# Patient Record
Sex: FEMALE | Race: BLACK OR AFRICAN AMERICAN | NOT HISPANIC OR LATINO | Employment: FULL TIME | URBAN - METROPOLITAN AREA
[De-identification: names, ages, dates, MRNs, and addresses within clinical notes are randomized per-mention and may not be internally consistent; named-entity substitution may affect disease eponyms.]

---

## 2017-02-06 RX ORDER — FLUCONAZOLE 150 MG/1
150 TABLET ORAL ONCE
Qty: 1 TABLET | Refills: 0 | Status: SHIPPED | OUTPATIENT
Start: 2017-02-06 | End: 2017-02-06

## 2017-03-01 ENCOUNTER — OFFICE VISIT (OUTPATIENT)
Dept: FAMILY MEDICINE CLINIC | Facility: CLINIC | Age: 62
End: 2017-03-01

## 2017-03-01 VITALS
DIASTOLIC BLOOD PRESSURE: 64 MMHG | WEIGHT: 149 LBS | HEIGHT: 64 IN | TEMPERATURE: 97.8 F | RESPIRATION RATE: 16 BRPM | BODY MASS INDEX: 25.44 KG/M2 | HEART RATE: 76 BPM | OXYGEN SATURATION: 94 % | SYSTOLIC BLOOD PRESSURE: 126 MMHG

## 2017-03-01 DIAGNOSIS — M79.7 FIBROMYALGIA: ICD-10-CM

## 2017-03-01 DIAGNOSIS — E55.9 VITAMIN D DEFICIENCY: ICD-10-CM

## 2017-03-01 DIAGNOSIS — Z12.31 ENCOUNTER FOR SCREENING MAMMOGRAM FOR BREAST CANCER: ICD-10-CM

## 2017-03-01 DIAGNOSIS — I10 ESSENTIAL HYPERTENSION: Primary | ICD-10-CM

## 2017-03-01 PROCEDURE — 99213 OFFICE O/P EST LOW 20 MIN: CPT | Performed by: PHYSICIAN ASSISTANT

## 2017-03-01 RX ORDER — TELMISARTAN AND AMLODIPINE 5; 80 MG/1; MG/1
1 TABLET ORAL DAILY
Qty: 90 TABLET | Refills: 1 | Status: SHIPPED | OUTPATIENT
Start: 2017-03-01 | End: 2017-08-14 | Stop reason: SDUPTHER

## 2017-03-01 NOTE — PROGRESS NOTES
Subjective   Boby Robles is a 61 y.o. female.     History of Present Illness   Boby Robles 61 y.o. female who presents today for routine follow up check and medication refills.  she has a history of   Patient Active Problem List   Diagnosis   • Allergy   • Fibromyalgia   • Hypertension   • Vitamin D deficiency   .  Since the last visit, she has overall felt well.  She has Hypertenision and is well controlled on medication.  she has been compliant with current medications have reviewed them.  The patient denies medication side effects.    Skin area itching right side neck on and off; no rash; can make her lymph nodes swell on that side    Had knee surgery  I have her avoiding NSAIDs d/t made her creat 1.08  Fibromyalgia is managed with exercise  No results found for: LIPASE  No results found for: CHOL  Lab Results   Component Value Date    TRIG 75 07/29/2016     Lab Results   Component Value Date    HDL 90 (H) 07/29/2016     No results found for: LDLCALC  Lab Results   Component Value Date    LDL 93 07/29/2016     No results found for: HDLLDLRATIO  No components found for: CHOLHDL   Mammo due next month    She wants to stay on Premarin and aware of possible risk VTE and MI/CVA, breast cancer    The following portions of the patient's history were reviewed and updated as appropriate: allergies, current medications, past family history, past medical history, past social history, past surgical history and problem list.    Review of Systems   Constitutional: Negative for activity change, appetite change and unexpected weight change.   HENT: Negative for nosebleeds and trouble swallowing.    Eyes: Negative for pain and visual disturbance.   Respiratory: Negative for chest tightness, shortness of breath and wheezing.    Cardiovascular: Negative for chest pain and palpitations.   Gastrointestinal: Negative for abdominal pain and blood in stool.   Endocrine: Negative.    Genitourinary: Negative for difficulty  urinating and hematuria.   Musculoskeletal: Negative for joint swelling.   Skin: Negative for color change and rash.   Allergic/Immunologic: Negative.    Neurological: Negative for syncope and speech difficulty.   Hematological: Negative for adenopathy.   Psychiatric/Behavioral: Negative for agitation and confusion.   All other systems reviewed and are negative.      Objective   Physical Exam   Constitutional: She is oriented to person, place, and time. She appears well-developed and well-nourished. No distress.   HENT:   Head: Normocephalic and atraumatic.   Eyes: Conjunctivae and EOM are normal. Pupils are equal, round, and reactive to light. Right eye exhibits no discharge. Left eye exhibits no discharge. No scleral icterus.   Neck: Normal range of motion. Neck supple. No tracheal deviation present. No thyromegaly present.   Cardiovascular: Normal rate, regular rhythm, normal heart sounds, intact distal pulses and normal pulses.  Exam reveals no gallop.    No murmur heard.  Pulmonary/Chest: Effort normal and breath sounds normal. No respiratory distress. She has no wheezes. She has no rales.   Musculoskeletal: Normal range of motion.   Lymphadenopathy:     She has no cervical adenopathy.   Neurological: She is alert and oriented to person, place, and time. She exhibits normal muscle tone. Coordination normal.   Skin: Skin is warm. No rash noted. No erythema. No pallor.   Psychiatric: She has a normal mood and affect. Her behavior is normal. Judgment and thought content normal.   Nursing note and vitals reviewed.      Assessment/Plan   Problems Addressed this Visit        Cardiovascular and Mediastinum    Hypertension - Primary    Relevant Medications    telmisartan-amLODIPine (TWYNSTA) 80-5 MG per tablet    Other Relevant Orders    Comprehensive metabolic panel    Lipid panel    CBC and Differential    TSH    T4, Free    Vitamin D 25 Hydroxy    Mammo Screening Bilateral With CAD       Digestive    Vitamin D  deficiency    Relevant Orders    Comprehensive metabolic panel    Lipid panel    CBC and Differential    TSH    T4, Free    Vitamin D 25 Hydroxy    Mammo Screening Bilateral With CAD       Musculoskeletal and Integument    Fibromyalgia    Relevant Orders    Comprehensive metabolic panel    Lipid panel    CBC and Differential    TSH    T4, Free    Vitamin D 25 Hydroxy    Mammo Screening Bilateral With CAD      Other Visit Diagnoses     Encounter for screening mammogram for breast cancer        Relevant Orders    Comprehensive metabolic panel    Lipid panel    CBC and Differential    TSH    T4, Free    Vitamin D 25 Hydroxy    Mammo Screening Bilateral With CAD

## 2017-03-01 NOTE — PATIENT INSTRUCTIONS
Labs July 1 or after  Low glycemic index diet  Exercise 30 minutes most days of the week  Make sure you get results on any labs or tests we ordered today  We discussed medications and how to take them as prescribed  Sleep 6-8 hours each night if possible  If you have not signed up for [employer], please activate your code ASAP from your After Visit Summary today    LDL goal <100  LDL goal if heart disease <70  HDL goal >60  Triglyceride goal <150  BP goal =<130/80  Fasting glucose <100    Try Hydrocortisone cream to chin 1%

## 2017-05-31 ENCOUNTER — OFFICE VISIT (OUTPATIENT)
Dept: FAMILY MEDICINE CLINIC | Facility: CLINIC | Age: 62
End: 2017-05-31

## 2017-05-31 VITALS
OXYGEN SATURATION: 97 % | HEIGHT: 64 IN | DIASTOLIC BLOOD PRESSURE: 60 MMHG | HEART RATE: 81 BPM | BODY MASS INDEX: 25.44 KG/M2 | TEMPERATURE: 97 F | RESPIRATION RATE: 16 BRPM | SYSTOLIC BLOOD PRESSURE: 120 MMHG | WEIGHT: 149 LBS

## 2017-05-31 DIAGNOSIS — H61.22 CERUMEN DEBRIS ON TYMPANIC MEMBRANE, LEFT: Primary | ICD-10-CM

## 2017-05-31 PROCEDURE — 99212 OFFICE O/P EST SF 10 MIN: CPT | Performed by: PHYSICIAN ASSISTANT

## 2017-07-01 ENCOUNTER — RESULTS ENCOUNTER (OUTPATIENT)
Dept: FAMILY MEDICINE CLINIC | Facility: CLINIC | Age: 62
End: 2017-07-01

## 2017-07-01 DIAGNOSIS — E55.9 VITAMIN D DEFICIENCY: ICD-10-CM

## 2017-07-01 DIAGNOSIS — Z12.31 ENCOUNTER FOR SCREENING MAMMOGRAM FOR BREAST CANCER: ICD-10-CM

## 2017-07-01 DIAGNOSIS — M79.7 FIBROMYALGIA: ICD-10-CM

## 2017-07-01 DIAGNOSIS — I10 ESSENTIAL HYPERTENSION: ICD-10-CM

## 2017-07-10 LAB
25(OH)D3+25(OH)D2 SERPL-MCNC: 31.2 NG/ML (ref 30–100)
ALBUMIN SERPL-MCNC: 3.9 G/DL (ref 3.6–4.8)
ALBUMIN/GLOB SERPL: 1.2 {RATIO} (ref 1.2–2.2)
ALP SERPL-CCNC: 63 IU/L (ref 39–117)
ALT SERPL-CCNC: 16 IU/L (ref 0–32)
AST SERPL-CCNC: 18 IU/L (ref 0–40)
BASOPHILS # BLD AUTO: 0.1 X10E3/UL (ref 0–0.2)
BASOPHILS NFR BLD AUTO: 1 %
BILIRUB SERPL-MCNC: 0.4 MG/DL (ref 0–1.2)
BUN SERPL-MCNC: 13 MG/DL (ref 8–27)
BUN/CREAT SERPL: 13 (ref 12–28)
CALCIUM SERPL-MCNC: 9.6 MG/DL (ref 8.7–10.3)
CHLORIDE SERPL-SCNC: 105 MMOL/L (ref 96–106)
CHOLEST SERPL-MCNC: 186 MG/DL (ref 100–199)
CO2 SERPL-SCNC: 25 MMOL/L (ref 18–29)
CREAT SERPL-MCNC: 0.98 MG/DL (ref 0.57–1)
EOSINOPHIL # BLD AUTO: 0.2 X10E3/UL (ref 0–0.4)
EOSINOPHIL NFR BLD AUTO: 3 %
ERYTHROCYTE [DISTWIDTH] IN BLOOD BY AUTOMATED COUNT: 12.4 % (ref 12.3–15.4)
GLOBULIN SER CALC-MCNC: 3.2 G/DL (ref 1.5–4.5)
GLUCOSE SERPL-MCNC: 86 MG/DL (ref 65–99)
HCT VFR BLD AUTO: 39 % (ref 34–46.6)
HDLC SERPL-MCNC: 90 MG/DL
HGB BLD-MCNC: 12.1 G/DL (ref 11.1–15.9)
IMM GRANULOCYTES # BLD: 0 X10E3/UL (ref 0–0.1)
IMM GRANULOCYTES NFR BLD: 0 %
LDLC SERPL CALC-MCNC: 81 MG/DL (ref 0–99)
LYMPHOCYTES # BLD AUTO: 1.9 X10E3/UL (ref 0.7–3.1)
LYMPHOCYTES NFR BLD AUTO: 34 %
MCH RBC QN AUTO: 29.3 PG (ref 26.6–33)
MCHC RBC AUTO-ENTMCNC: 31 G/DL (ref 31.5–35.7)
MCV RBC AUTO: 94 FL (ref 79–97)
MONOCYTES # BLD AUTO: 0.3 X10E3/UL (ref 0.1–0.9)
MONOCYTES NFR BLD AUTO: 6 %
NEUTROPHILS # BLD AUTO: 3.2 X10E3/UL (ref 1.4–7)
NEUTROPHILS NFR BLD AUTO: 56 %
PLATELET # BLD AUTO: 261 X10E3/UL (ref 150–379)
POTASSIUM SERPL-SCNC: 4.4 MMOL/L (ref 3.5–5.2)
PROT SERPL-MCNC: 7.1 G/DL (ref 6–8.5)
RBC # BLD AUTO: 4.13 X10E6/UL (ref 3.77–5.28)
SODIUM SERPL-SCNC: 145 MMOL/L (ref 134–144)
T4 FREE SERPL-MCNC: 1.34 NG/DL (ref 0.82–1.77)
TRIGL SERPL-MCNC: 77 MG/DL (ref 0–149)
TSH SERPL DL<=0.005 MIU/L-ACNC: 0.94 UIU/ML (ref 0.45–4.5)
VLDLC SERPL CALC-MCNC: 15 MG/DL (ref 5–40)
WBC # BLD AUTO: 5.7 X10E3/UL (ref 3.4–10.8)

## 2017-08-15 RX ORDER — TELMISARTAN AND AMLODIPINE 5; 80 MG/1; MG/1
TABLET ORAL
Qty: 90 TABLET | Refills: 0 | Status: SHIPPED | OUTPATIENT
Start: 2017-08-15 | End: 2017-11-03 | Stop reason: SDUPTHER

## 2017-11-03 ENCOUNTER — OFFICE VISIT (OUTPATIENT)
Dept: FAMILY MEDICINE CLINIC | Facility: CLINIC | Age: 62
End: 2017-11-03

## 2017-11-03 VITALS
TEMPERATURE: 98.3 F | BODY MASS INDEX: 25.61 KG/M2 | WEIGHT: 150 LBS | HEIGHT: 64 IN | RESPIRATION RATE: 16 BRPM | HEART RATE: 66 BPM | OXYGEN SATURATION: 100 % | DIASTOLIC BLOOD PRESSURE: 70 MMHG | SYSTOLIC BLOOD PRESSURE: 120 MMHG

## 2017-11-03 DIAGNOSIS — M54.50 ACUTE RIGHT-SIDED LOW BACK PAIN WITHOUT SCIATICA: Primary | ICD-10-CM

## 2017-11-03 DIAGNOSIS — Z79.1 NSAID LONG-TERM USE: ICD-10-CM

## 2017-11-03 DIAGNOSIS — R35.0 URINARY FREQUENCY: ICD-10-CM

## 2017-11-03 DIAGNOSIS — M25.551 RIGHT HIP PAIN: ICD-10-CM

## 2017-11-03 LAB
BILIRUB BLD-MCNC: NEGATIVE MG/DL
CLARITY, POC: CLEAR
COLOR UR: YELLOW
GLUCOSE UR STRIP-MCNC: NEGATIVE MG/DL
KETONES UR QL: NEGATIVE
LEUKOCYTE EST, POC: NEGATIVE
NITRITE UR-MCNC: NEGATIVE MG/ML
PH UR: 7 [PH] (ref 5–8)
PROT UR STRIP-MCNC: NEGATIVE MG/DL
RBC # UR STRIP: NEGATIVE /UL
SP GR UR: 1.01 (ref 1–1.03)
UROBILINOGEN UR QL: NORMAL

## 2017-11-03 PROCEDURE — 99214 OFFICE O/P EST MOD 30 MIN: CPT | Performed by: PHYSICIAN ASSISTANT

## 2017-11-03 PROCEDURE — 81003 URINALYSIS AUTO W/O SCOPE: CPT | Performed by: PHYSICIAN ASSISTANT

## 2017-11-03 PROCEDURE — 73502 X-RAY EXAM HIP UNI 2-3 VIEWS: CPT | Performed by: PHYSICIAN ASSISTANT

## 2017-11-03 RX ORDER — TELMISARTAN AND AMLODIPINE 5; 80 MG/1; MG/1
1 TABLET ORAL DAILY
Qty: 90 TABLET | Refills: 1 | Status: SHIPPED | OUTPATIENT
Start: 2017-11-03 | End: 2018-05-13 | Stop reason: SDUPTHER

## 2017-11-03 RX ORDER — MELOXICAM 15 MG/1
15 TABLET ORAL DAILY
COMMUNITY
End: 2018-11-05 | Stop reason: SDUPTHER

## 2017-11-03 NOTE — PROGRESS NOTES
Subjective   Boby Robles is a 62 y.o. female.     History of Present Illness   Having hip and back pain;   Boby Robles 62 y.o. female presents for evaluation and treatment of  low back pain right lumbar and right groin. The patient first noted symptoms several months ago. It was related to stumbled about a month ago and pain since then.  I will need to get hip xray;  will copy for her to take to Dr Lerma appt 11-27-17.  The pain intensity is mild and moderate , and is located right side , lumbar and right hip. The pain is described as aching, throbbing and stiff and occurs with movement, after prolonged rest then moves, with prolonged sitting, standing, laying, walking and constantly. The symptoms are progressive. Symptoms are exacerbated by light exertion and prolonged sitting, standing, and laying. Factors which relieve the pain include NSAID's and heat. Other associated symptoms include denies motor loss, denies sensory loss, denies burning in legs, denies N/T in legs, denies weakness in legs, denies loss of muscle tone and denies loss of bowel or bladder control. Previous history of symptoms: last few months. Treatment efforts have included nothing helps , without relief.      Boby Robles 62 y.o. female who presents today for routine follow up check and medication refills.  she has a history of   Patient Active Problem List   Diagnosis   • Allergy   • Fibromyalgia   • Hypertension   • Vitamin D deficiency   .  Since the last visit, she has overall felt well.  She has Hypertenision and is well controlled on medication.  she has been compliant with current medications have reviewed them.  The patient denies medication side effects.  mobic does help knee pain  X-Ray  Interpretation report in house X-rays that I personally viewed    Relevant Clinical Issues/Diagnoses/Indications:  Pain right hip        Clinical Findings:  DDD changes and no fx          Comparative Data:  none          Date of Previous  X-ray:  Change on current X-ray    Stop NSAID if GI upset or any signs tarry or blood in stools  Long term use of NSAIDs can lead to heart disease and strokes, as well as GI bleeding/ulcers, and cause kidney disease.   Lab Results   Component Value Date    BUN 13 07/08/2017    CREATININE 0.98 07/08/2017    EGFRIFNONA 62 07/08/2017    EGFRIFAFRI 72 07/08/2017    BCR 13 07/08/2017    K 4.4 07/08/2017    CO2 25 07/08/2017    CALCIUM 9.6 07/08/2017    PROTENTOTREF 7.1 07/08/2017    ALBUMIN 3.9 07/08/2017    LABIL2 1.2 07/08/2017    AST 18 07/08/2017    ALT 16 07/08/2017     Lab Results   Component Value Date    CHLPL 186 07/08/2017    TRIG 77 07/08/2017    HDL 90 07/08/2017    LDL 81 07/08/2017   vit D was 31  She is on HRT and aware of VTE, breast cancer risk    She is having urinary frequency in last week    The following portions of the patient's history were reviewed and updated as appropriate: allergies, current medications, past family history, past medical history, past social history, past surgical history and problem list.    Review of Systems   Constitutional: Negative for activity change, appetite change and unexpected weight change.   HENT: Negative for nosebleeds and trouble swallowing.    Eyes: Negative for pain and visual disturbance.   Respiratory: Negative for chest tightness, shortness of breath and wheezing.    Cardiovascular: Negative for chest pain and palpitations.   Gastrointestinal: Negative for abdominal pain and blood in stool.   Endocrine: Negative.    Genitourinary: Positive for frequency. Negative for difficulty urinating and hematuria.   Musculoskeletal: Positive for arthralgias, back pain and myalgias. Negative for joint swelling.   Skin: Negative for color change and rash.   Allergic/Immunologic: Negative.    Neurological: Negative for syncope and speech difficulty.   Hematological: Negative for adenopathy.   Psychiatric/Behavioral: Negative for agitation and confusion.   All other  systems reviewed and are negative.      Objective   Physical Exam   Constitutional: She is oriented to person, place, and time. She appears well-developed and well-nourished. No distress.   HENT:   Head: Normocephalic and atraumatic.   Eyes: Conjunctivae and EOM are normal. Pupils are equal, round, and reactive to light. Right eye exhibits no discharge. Left eye exhibits no discharge. No scleral icterus.   Neck: Normal range of motion. Neck supple. No tracheal deviation present. No thyromegaly present.   Cardiovascular: Normal rate, regular rhythm, normal heart sounds, intact distal pulses and normal pulses.  Exam reveals no gallop.    No murmur heard.  Pulmonary/Chest: Effort normal and breath sounds normal. No respiratory distress. She has no wheezes. She has no rales.   Musculoskeletal: Normal range of motion. She exhibits tenderness (right greater trochanter; sore in groin and no lymph nodes palp;  sore right lumbar piriformis area). She exhibits no edema or deformity.   Hip ROM pain right and hurts in back and groin;    Neurological: She is alert and oriented to person, place, and time. She exhibits normal muscle tone. Coordination normal.   Skin: Skin is warm. No rash noted. No erythema. No pallor.   Psychiatric: She has a normal mood and affect. Her behavior is normal. Judgment and thought content normal.   Nursing note and vitals reviewed.      Assessment/Plan   Problems Addressed this Visit     None      Visit Diagnoses     Acute right-sided low back pain without sciatica    -  Primary    Right hip pain        Relevant Orders    XR hip w or wo pelvis 2-3 view right    Urinary frequency        Relevant Orders    POC Urinalysis Dipstick, Automated    NSAID long-term use        Relevant Orders    Basic Metabolic Panel

## 2017-11-03 NOTE — PATIENT INSTRUCTIONS
Stop NSAID if GI upset or any signs tarry or blood in stools  Low glycemic index diet  Exercise 30 minutes most days of the week  Make sure you get results on any labs or tests we ordered today  We discussed medications and how to take them as prescribed  Sleep 6-8 hours each night if possible  If you have not signed up for [employer], please activate your code ASAP from your After Visit Summary today    LDL goal <100  LDL goal if heart disease <70  HDL goal >60  Triglyceride goal <150  BP goal =<130/80  Fasting glucose <100

## 2017-11-06 ENCOUNTER — TELEPHONE (OUTPATIENT)
Dept: FAMILY MEDICINE CLINIC | Facility: CLINIC | Age: 62
End: 2017-11-06

## 2017-11-09 RX ORDER — CONJUGATED ESTROGENS 0.62 MG/1
TABLET, FILM COATED ORAL
Qty: 90 TABLET | Refills: 0 | OUTPATIENT
Start: 2017-11-09

## 2018-01-01 ENCOUNTER — RESULTS ENCOUNTER (OUTPATIENT)
Dept: FAMILY MEDICINE CLINIC | Facility: CLINIC | Age: 63
End: 2018-01-01

## 2018-01-01 DIAGNOSIS — Z79.1 NSAID LONG-TERM USE: ICD-10-CM

## 2018-01-01 DIAGNOSIS — Z00.00 LABORATORY EXAMINATION ORDERED AS PART OF A ROUTINE GENERAL MEDICAL EXAMINATION: Primary | ICD-10-CM

## 2018-01-23 LAB
BUN SERPL-MCNC: 10 MG/DL (ref 8–23)
BUN/CREAT SERPL: 10.1 (ref 7–25)
CALCIUM SERPL-MCNC: 9.6 MG/DL (ref 8.6–10.5)
CHLORIDE SERPL-SCNC: 105 MMOL/L (ref 98–107)
CO2 SERPL-SCNC: 27.8 MMOL/L (ref 22–29)
CREAT SERPL-MCNC: 0.99 MG/DL (ref 0.57–1)
GLUCOSE SERPL-MCNC: 97 MG/DL (ref 65–99)
POTASSIUM SERPL-SCNC: 4.8 MMOL/L (ref 3.5–5.2)
SODIUM SERPL-SCNC: 145 MMOL/L (ref 136–145)

## 2018-03-07 ENCOUNTER — TELEPHONE (OUTPATIENT)
Dept: FAMILY MEDICINE CLINIC | Facility: CLINIC | Age: 63
End: 2018-03-07

## 2018-03-07 RX ORDER — FLUCONAZOLE 150 MG/1
150 TABLET ORAL ONCE
Qty: 1 TABLET | Refills: 2 | Status: SHIPPED | OUTPATIENT
Start: 2018-03-07 | End: 2018-03-07

## 2018-03-07 NOTE — TELEPHONE ENCOUNTER
Pt is wanting to know if she can have a diflucan called in. She is having a discharge. Not itching, or pain

## 2018-03-27 ENCOUNTER — OFFICE (OUTPATIENT)
Dept: URBAN - METROPOLITAN AREA CLINIC 75 | Facility: CLINIC | Age: 63
End: 2018-03-27

## 2018-03-27 VITALS
WEIGHT: 150 LBS | HEART RATE: 72 BPM | HEIGHT: 64 IN | SYSTOLIC BLOOD PRESSURE: 118 MMHG | DIASTOLIC BLOOD PRESSURE: 76 MMHG

## 2018-03-27 DIAGNOSIS — R10.11 RIGHT UPPER QUADRANT PAIN: ICD-10-CM

## 2018-03-27 DIAGNOSIS — Z12.11 ENCOUNTER FOR SCREENING FOR MALIGNANT NEOPLASM OF COLON: ICD-10-CM

## 2018-03-27 DIAGNOSIS — R10.31 RIGHT LOWER QUADRANT PAIN: ICD-10-CM

## 2018-03-27 PROCEDURE — 99203 OFFICE O/P NEW LOW 30 MIN: CPT | Performed by: NURSE PRACTITIONER

## 2018-04-23 DIAGNOSIS — R92.8 ABNORMALITY OF LEFT BREAST ON SCREENING MAMMOGRAM: Primary | ICD-10-CM

## 2018-04-30 NOTE — PROGRESS NOTES
Called patient and advised of results.   Patient verbalized understanding.  Faxed order to [facility] and pt is calling them

## 2018-05-14 RX ORDER — CONJUGATED ESTROGENS 0.62 MG/1
TABLET, FILM COATED ORAL
Qty: 90 TABLET | Refills: 0 | Status: SHIPPED | OUTPATIENT
Start: 2018-05-14 | End: 2018-08-09 | Stop reason: SDUPTHER

## 2018-05-14 RX ORDER — TELMISARTAN AND AMLODIPINE 5; 80 MG/1; MG/1
TABLET ORAL
Qty: 90 TABLET | Refills: 0 | Status: SHIPPED | OUTPATIENT
Start: 2018-05-14 | End: 2018-08-09 | Stop reason: SDUPTHER

## 2018-07-01 ENCOUNTER — RESULTS ENCOUNTER (OUTPATIENT)
Dept: FAMILY MEDICINE CLINIC | Facility: CLINIC | Age: 63
End: 2018-07-01

## 2018-07-01 DIAGNOSIS — Z00.00 LABORATORY EXAMINATION ORDERED AS PART OF A ROUTINE GENERAL MEDICAL EXAMINATION: ICD-10-CM

## 2018-07-12 LAB
25(OH)D3+25(OH)D2 SERPL-MCNC: 34.9 NG/ML (ref 30–100)
ALBUMIN SERPL-MCNC: 3.9 G/DL (ref 3.5–5.2)
ALBUMIN/GLOB SERPL: 1.4 G/DL
ALP SERPL-CCNC: 58 U/L (ref 39–117)
ALT SERPL-CCNC: 13 U/L (ref 1–33)
AST SERPL-CCNC: 17 U/L (ref 1–32)
BASOPHILS # BLD AUTO: 0.06 10*3/MM3 (ref 0–0.2)
BASOPHILS NFR BLD AUTO: 1.1 % (ref 0–1.5)
BILIRUB SERPL-MCNC: 0.4 MG/DL (ref 0.1–1.2)
BUN SERPL-MCNC: 13 MG/DL (ref 8–23)
BUN/CREAT SERPL: 11.1 (ref 7–25)
CALCIUM SERPL-MCNC: 9.7 MG/DL (ref 8.6–10.5)
CHLORIDE SERPL-SCNC: 107 MMOL/L (ref 98–107)
CHOLEST SERPL-MCNC: 185 MG/DL (ref 0–200)
CO2 SERPL-SCNC: 25.4 MMOL/L (ref 22–29)
CREAT SERPL-MCNC: 1.17 MG/DL (ref 0.57–1)
EOSINOPHIL # BLD AUTO: 0.16 10*3/MM3 (ref 0–0.7)
EOSINOPHIL NFR BLD AUTO: 2.9 % (ref 0.3–6.2)
ERYTHROCYTE [DISTWIDTH] IN BLOOD BY AUTOMATED COUNT: 12.4 % (ref 11.7–13)
GLOBULIN SER CALC-MCNC: 2.7 GM/DL
GLUCOSE SERPL-MCNC: 88 MG/DL (ref 65–99)
HCT VFR BLD AUTO: 38 % (ref 35.6–45.5)
HDLC SERPL-MCNC: 76 MG/DL (ref 40–60)
HGB BLD-MCNC: 11.9 G/DL (ref 11.9–15.5)
IMM GRANULOCYTES # BLD: 0.01 10*3/MM3 (ref 0–0.03)
IMM GRANULOCYTES NFR BLD: 0.2 % (ref 0–0.5)
LDLC SERPL CALC-MCNC: 90 MG/DL (ref 0–100)
LYMPHOCYTES # BLD AUTO: 2.25 10*3/MM3 (ref 0.9–4.8)
LYMPHOCYTES NFR BLD AUTO: 40.6 % (ref 19.6–45.3)
MCH RBC QN AUTO: 29.5 PG (ref 26.9–32)
MCHC RBC AUTO-ENTMCNC: 31.3 G/DL (ref 32.4–36.3)
MCV RBC AUTO: 94.1 FL (ref 80.5–98.2)
MONOCYTES # BLD AUTO: 0.31 10*3/MM3 (ref 0.2–1.2)
MONOCYTES NFR BLD AUTO: 5.6 % (ref 5–12)
NEUTROPHILS # BLD AUTO: 2.76 10*3/MM3 (ref 1.9–8.1)
NEUTROPHILS NFR BLD AUTO: 49.8 % (ref 42.7–76)
PLATELET # BLD AUTO: 246 10*3/MM3 (ref 140–500)
POTASSIUM SERPL-SCNC: 4.6 MMOL/L (ref 3.5–5.2)
PROT SERPL-MCNC: 6.6 G/DL (ref 6–8.5)
RBC # BLD AUTO: 4.04 10*6/MM3 (ref 3.9–5.2)
SODIUM SERPL-SCNC: 144 MMOL/L (ref 136–145)
T4 FREE SERPL-MCNC: 1.19 NG/DL (ref 0.93–1.7)
TRIGL SERPL-MCNC: 95 MG/DL (ref 0–150)
TSH SERPL DL<=0.005 MIU/L-ACNC: 1.36 MIU/ML (ref 0.27–4.2)
VLDLC SERPL CALC-MCNC: 19 MG/DL (ref 5–40)
WBC # BLD AUTO: 5.54 10*3/MM3 (ref 4.5–10.7)

## 2018-08-09 RX ORDER — CONJUGATED ESTROGENS 0.62 MG/1
TABLET, FILM COATED ORAL
Qty: 90 TABLET | Refills: 0 | Status: SHIPPED | OUTPATIENT
Start: 2018-08-09 | End: 2018-11-05 | Stop reason: SDUPTHER

## 2018-08-09 RX ORDER — TELMISARTAN AND AMLODIPINE 5; 80 MG/1; MG/1
TABLET ORAL
Qty: 90 TABLET | Refills: 0 | Status: SHIPPED | OUTPATIENT
Start: 2018-08-09 | End: 2018-11-05 | Stop reason: SDUPTHER

## 2018-11-05 ENCOUNTER — OFFICE VISIT (OUTPATIENT)
Dept: FAMILY MEDICINE CLINIC | Facility: CLINIC | Age: 63
End: 2018-11-05

## 2018-11-05 VITALS
HEART RATE: 90 BPM | BODY MASS INDEX: 25.78 KG/M2 | OXYGEN SATURATION: 99 % | RESPIRATION RATE: 16 BRPM | DIASTOLIC BLOOD PRESSURE: 72 MMHG | TEMPERATURE: 97.9 F | WEIGHT: 151 LBS | SYSTOLIC BLOOD PRESSURE: 122 MMHG | HEIGHT: 64 IN

## 2018-11-05 DIAGNOSIS — E55.9 VITAMIN D DEFICIENCY: ICD-10-CM

## 2018-11-05 DIAGNOSIS — K29.00 OTHER ACUTE GASTRITIS WITHOUT HEMORRHAGE: ICD-10-CM

## 2018-11-05 DIAGNOSIS — M79.7 FIBROMYALGIA: ICD-10-CM

## 2018-11-05 DIAGNOSIS — M15.9 PRIMARY OSTEOARTHRITIS INVOLVING MULTIPLE JOINTS: ICD-10-CM

## 2018-11-05 DIAGNOSIS — Z79.899 LONG-TERM USE OF HIGH-RISK MEDICATION: ICD-10-CM

## 2018-11-05 DIAGNOSIS — I10 ESSENTIAL HYPERTENSION: Primary | ICD-10-CM

## 2018-11-05 PROCEDURE — 99214 OFFICE O/P EST MOD 30 MIN: CPT | Performed by: PHYSICIAN ASSISTANT

## 2018-11-05 RX ORDER — MELOXICAM 7.5 MG/1
7.5 TABLET ORAL DAILY
Qty: 90 TABLET | Refills: 1 | Status: SHIPPED | OUTPATIENT
Start: 2018-11-05 | End: 2019-12-04

## 2018-11-05 RX ORDER — TELMISARTAN AND AMLODIPINE 5; 80 MG/1; MG/1
1 TABLET ORAL DAILY
Qty: 90 TABLET | Refills: 3 | Status: SHIPPED | OUTPATIENT
Start: 2018-11-05 | End: 2019-11-18 | Stop reason: SDUPTHER

## 2018-11-05 RX ORDER — MELOXICAM 15 MG/1
15 TABLET ORAL DAILY
Qty: 90 TABLET | Refills: 3 | Status: SHIPPED | OUTPATIENT
Start: 2018-11-05 | End: 2018-11-05

## 2018-11-05 RX ORDER — LANSOPRAZOLE 30 MG/1
30 CAPSULE, DELAYED RELEASE ORAL DAILY
Qty: 30 CAPSULE | Refills: 0 | Status: SHIPPED | OUTPATIENT
Start: 2018-11-05 | End: 2019-12-04

## 2018-11-05 NOTE — PROGRESS NOTES
Subjective   Boby Robles is a 63 y.o. female.     History of Present Illness       Had xray with DR Lerma Sept and did xray right hip and lumbar spine  I had done xray last Nov of right hip and OA changes  Dr Lerma put her on NSAID    She has appt with Dr Lerma to f/u to see if needs more work up    I will have her take NSAID for the 30 days--I will let her go back on Mobic at 7.5mg; will watch renal functions    She has pain right hip and back; right leg  I also though about Fibromyalgia and did trial Savella and intol.  She never took Cymbalta.  She has episodes of anxiety with taking meds.  Will wait on this.  I will need to get BMP today and make sure to monitor renal functions  Expect less GI upset with Mobic.  I did labs in July and was worried about renal functions.    Boby Robles 63 y.o. female who presents today for routine follow up check and medication refills.  she has a history of   Patient Active Problem List   Diagnosis   • Allergy   • Fibromyalgia   • Hypertension   • Vitamin D deficiency   .  Since the last visit, she has overall felt fairly well.  She has Hypertenision and is well controlled on medication, Hyperlipidemia and working on this with diet and exercise, Seasonal allergies and is doing well on their medication PRN and Vitamin D deficiency and well controlled on medication and labs at goal >30.  she has been compliant with current medications have reviewed them.  The patient has epigastric burning from Indocin  PPI for 30 days to heal; I suggest Prevacid for 30 days    2013 AHA (American Heart Association) Cholesterol Risk Ratio Score Goal is <=7.5% and your score is:  6.0% based on July labs  Results for orders placed or performed in visit on 07/01/18   Comprehensive metabolic panel   Result Value Ref Range    Glucose 88 65 - 99 mg/dL    BUN 13 8 - 23 mg/dL    Creatinine 1.17 (H) 0.57 - 1.00 mg/dL    eGFR Non African Am 47 (L) >60 mL/min/1.73    eGFR African Am 57 (L) >60 mL/min/1.73     BUN/Creatinine Ratio 11.1 7.0 - 25.0    Sodium 144 136 - 145 mmol/L    Potassium 4.6 3.5 - 5.2 mmol/L    Chloride 107 98 - 107 mmol/L    Total CO2 25.4 22.0 - 29.0 mmol/L    Calcium 9.7 8.6 - 10.5 mg/dL    Total Protein 6.6 6.0 - 8.5 g/dL    Albumin 3.90 3.50 - 5.20 g/dL    Globulin 2.7 gm/dL    A/G Ratio 1.4 g/dL    Total Bilirubin 0.4 0.1 - 1.2 mg/dL    Alkaline Phosphatase 58 39 - 117 U/L    AST (SGOT) 17 1 - 32 U/L    ALT (SGPT) 13 1 - 33 U/L   Lipid panel   Result Value Ref Range    Total Cholesterol 185 0 - 200 mg/dL    Triglycerides 95 0 - 150 mg/dL    HDL Cholesterol 76 (H) 40 - 60 mg/dL    VLDL Cholesterol 19 5 - 40 mg/dL    LDL Cholesterol  90 0 - 100 mg/dL   TSH   Result Value Ref Range    TSH 1.360 0.270 - 4.200 mIU/mL   T4, Free   Result Value Ref Range    Free T4 1.19 0.93 - 1.70 ng/dL   Vitamin D 25 Hydroxy   Result Value Ref Range    25 Hydroxy, Vitamin D 34.9 30.0 - 100.0 ng/ml   CBC and Differential   Result Value Ref Range    WBC 5.54 4.50 - 10.70 10*3/mm3    RBC 4.04 3.90 - 5.20 10*6/mm3    Hemoglobin 11.9 11.9 - 15.5 g/dL    Hematocrit 38.0 35.6 - 45.5 %    MCV 94.1 80.5 - 98.2 fL    MCH 29.5 26.9 - 32.0 pg    MCHC 31.3 (L) 32.4 - 36.3 g/dL    RDW 12.4 11.7 - 13.0 %    Platelets 246 140 - 500 10*3/mm3    Neutrophil Rel % 49.8 42.7 - 76.0 %    Lymphocyte Rel % 40.6 19.6 - 45.3 %    Monocyte Rel % 5.6 5.0 - 12.0 %    Eosinophil Rel % 2.9 0.3 - 6.2 %    Basophil Rel % 1.1 0.0 - 1.5 %    Neutrophils Absolute 2.76 1.90 - 8.10 10*3/mm3    Lymphocytes Absolute 2.25 0.90 - 4.80 10*3/mm3    Monocytes Absolute 0.31 0.20 - 1.20 10*3/mm3    Eosinophils Absolute 0.16 0.00 - 0.70 10*3/mm3    Basophils Absolute 0.06 0.00 - 0.20 10*3/mm3    Immature Granulocyte Rel % 0.2 0.0 - 0.5 %    Immature Grans Absolute 0.01 0.00 - 0.03 10*3/mm3       She wants to stay on HRT and aware risk VTE and MI;  Will lower need to consider lowering dose and want to wait  Declines DEXA    Get BMP today and then decide about  Mobic    The following portions of the patient's history were reviewed and updated as appropriate: allergies, current medications, past family history, past medical history, past social history, past surgical history and problem list.    Review of Systems   Constitutional: Negative for activity change, appetite change and unexpected weight change.   HENT: Negative for nosebleeds and trouble swallowing.    Eyes: Negative for pain and visual disturbance.   Respiratory: Negative for chest tightness, shortness of breath and wheezing.    Cardiovascular: Negative for chest pain and palpitations.   Gastrointestinal: Positive for abdominal pain. Negative for blood in stool.   Endocrine: Negative.    Genitourinary: Negative for difficulty urinating and hematuria.   Musculoskeletal: Positive for arthralgias and back pain. Negative for joint swelling.   Skin: Negative for color change and rash.   Allergic/Immunologic: Negative.    Neurological: Negative for syncope and speech difficulty.   Hematological: Negative for adenopathy.   Psychiatric/Behavioral: Negative for agitation and confusion. The patient is nervous/anxious.    All other systems reviewed and are negative.      Objective   Physical Exam   Constitutional: She is oriented to person, place, and time. She appears well-developed and well-nourished. No distress.   HENT:   Head: Normocephalic and atraumatic.   Eyes: Pupils are equal, round, and reactive to light. Conjunctivae and EOM are normal. Right eye exhibits no discharge. Left eye exhibits no discharge. No scleral icterus.   Neck: Normal range of motion. Neck supple. No tracheal deviation present. No thyromegaly present.   Cardiovascular: Normal rate, regular rhythm, normal heart sounds, intact distal pulses and normal pulses.  Exam reveals no gallop.    No murmur heard.  Pulmonary/Chest: Effort normal and breath sounds normal. No respiratory distress. She has no wheezes. She has no rales.   Abdominal: Soft. There  is tenderness (epigastric).   Musculoskeletal: Normal range of motion.   Neurological: She is alert and oriented to person, place, and time. She exhibits normal muscle tone. Coordination normal.   Skin: Skin is warm. No rash noted. No erythema. No pallor.   Psychiatric: She has a normal mood and affect. Her behavior is normal. Judgment and thought content normal.   Nursing note and vitals reviewed.      Assessment/Plan   Boby was seen today for back pain.    Diagnoses and all orders for this visit:    Essential hypertension  -     Basic Metabolic Panel    Vitamin D deficiency  -     Basic Metabolic Panel    Fibromyalgia  -     Basic Metabolic Panel    Primary osteoarthritis involving multiple joints  -     Basic Metabolic Panel    Other acute gastritis without hemorrhage    Other orders  -     Cancel: estrogens, conjugated, (PREMARIN) 0.625 MG tablet; Take 1 tablet by mouth Daily for 90 days. For HRT  -     telmisartan-amLODIPine (TWYNSTA) 80-5 MG per tablet; Take 1 tablet by mouth Daily. For BP  -     meloxicam (MOBIC) 15 MG tablet; Take 1 tablet by mouth Daily. For pain with food PRN  -     estrogens, conjugated, (PREMARIN) 0.625 MG tablet; Take 1 tablet by mouth Daily. One PO daily  -     lansoprazole (PREVACID) 30 MG capsule; Take 1 capsule by mouth Daily. For gastritis

## 2018-11-05 NOTE — PATIENT INSTRUCTIONS
Low glycemic index diet  Exercise 30 minutes most days of the week  Make sure you get results on any labs or tests we ordered today  We discussed medications and how to take them as prescribed  Sleep 6-8 hours each night if possible  If you have not signed up for [employer], please activate your code ASAP from your After Visit Summary today    LDL goal <100  LDL goal if heart disease <70  HDL goal >60  Triglyceride goal <150  BP goal =<130/80  Fasting glucose <100

## 2018-11-06 LAB
BUN SERPL-MCNC: 12 MG/DL (ref 8–23)
BUN/CREAT SERPL: 12.6 (ref 7–25)
CALCIUM SERPL-MCNC: 9.4 MG/DL (ref 8.6–10.5)
CHLORIDE SERPL-SCNC: 102 MMOL/L (ref 98–107)
CO2 SERPL-SCNC: 27 MMOL/L (ref 22–29)
CREAT SERPL-MCNC: 0.95 MG/DL (ref 0.57–1)
GLUCOSE SERPL-MCNC: 90 MG/DL (ref 65–99)
POTASSIUM SERPL-SCNC: 4 MMOL/L (ref 3.5–5.2)
SODIUM SERPL-SCNC: 139 MMOL/L (ref 136–145)

## 2018-11-12 ENCOUNTER — PRIOR AUTHORIZATION (OUTPATIENT)
Dept: FAMILY MEDICINE CLINIC | Facility: CLINIC | Age: 63
End: 2018-11-12

## 2018-12-04 ENCOUNTER — RESULTS ENCOUNTER (OUTPATIENT)
Dept: FAMILY MEDICINE CLINIC | Facility: CLINIC | Age: 63
End: 2018-12-04

## 2018-12-04 DIAGNOSIS — Z79.899 LONG-TERM USE OF HIGH-RISK MEDICATION: ICD-10-CM

## 2019-01-25 ENCOUNTER — OFFICE VISIT (OUTPATIENT)
Dept: FAMILY MEDICINE CLINIC | Facility: CLINIC | Age: 64
End: 2019-01-25

## 2019-01-25 VITALS
HEIGHT: 64 IN | TEMPERATURE: 99.2 F | SYSTOLIC BLOOD PRESSURE: 110 MMHG | RESPIRATION RATE: 18 BRPM | DIASTOLIC BLOOD PRESSURE: 60 MMHG | WEIGHT: 150 LBS | OXYGEN SATURATION: 100 % | BODY MASS INDEX: 25.61 KG/M2 | HEART RATE: 94 BPM

## 2019-01-25 DIAGNOSIS — J10.1 INFLUENZA A: Primary | ICD-10-CM

## 2019-01-25 LAB
EXPIRATION DATE: ABNORMAL
FLUAV AG NPH QL: POSITIVE
FLUBV AG NPH QL: NEGATIVE
INTERNAL CONTROL: ABNORMAL
Lab: ABNORMAL

## 2019-01-25 PROCEDURE — 87804 INFLUENZA ASSAY W/OPTIC: CPT | Performed by: NURSE PRACTITIONER

## 2019-01-25 PROCEDURE — 99213 OFFICE O/P EST LOW 20 MIN: CPT | Performed by: NURSE PRACTITIONER

## 2019-01-25 NOTE — PROGRESS NOTES
Subjective   Boby Robles is a 63 y.o. female.     History of Present Illness   Boby Robles 63 y.o. female who presents for evaluation of flu like symptoms. Symptoms include ear pressure, congestion, post nasal drip, productive cough, myalgias, fever, chills and fatigue.  Onset of symptoms was 3 days ago, gradually worsening since that time. Patient denies diarrhea, vomiting.   Evaluation to date: none Treatment to date:  Tylenol.     The following portions of the patient's history were reviewed and updated as appropriate: allergies, current medications, past family history, past medical history, past social history, past surgical history and problem list.    Review of Systems   Constitutional: Positive for chills, fatigue and fever.   HENT: Positive for congestion, postnasal drip, sinus pressure and sore throat. Negative for trouble swallowing.    Respiratory: Positive for cough.    Musculoskeletal: Positive for myalgias.       Objective   Physical Exam   Constitutional: She is oriented to person, place, and time. She appears well-developed and well-nourished.   HENT:   Right Ear: Tympanic membrane, external ear and ear canal normal.   Left Ear: Tympanic membrane, external ear and ear canal normal.   Nose: Mucosal edema and rhinorrhea present. Right sinus exhibits no maxillary sinus tenderness and no frontal sinus tenderness. Left sinus exhibits no maxillary sinus tenderness and no frontal sinus tenderness.   Mouth/Throat: Uvula is midline. Posterior oropharyngeal erythema present.   Cardiovascular: Normal rate and regular rhythm.   Pulmonary/Chest: Effort normal and breath sounds normal.   Neurological: She is alert and oriented to person, place, and time.   Skin: Skin is warm.   Psychiatric: She has a normal mood and affect. Judgment normal.   Nursing note and vitals reviewed.      Assessment/Plan   Boby was seen today for headache, fever, cough and shortness of breath.    Diagnoses and all orders for  this visit:    Influenza A  -     POCT Influenza A/B  -     Baloxavir Marboxil 40 MG Dose (XOFLUZA) 20 (2) MG tablet therapy pack; Take 2 tablets by mouth 1 (One) Time for 1 dose.

## 2019-04-09 LAB
BUN SERPL-MCNC: 13 MG/DL (ref 8–23)
BUN/CREAT SERPL: 12.9 (ref 7–25)
CALCIUM SERPL-MCNC: 9.8 MG/DL (ref 8.6–10.5)
CHLORIDE SERPL-SCNC: 106 MMOL/L (ref 98–107)
CO2 SERPL-SCNC: 27.3 MMOL/L (ref 22–29)
CREAT SERPL-MCNC: 1.01 MG/DL (ref 0.57–1)
GLUCOSE SERPL-MCNC: 88 MG/DL (ref 65–99)
POTASSIUM SERPL-SCNC: 4.7 MMOL/L (ref 3.5–5.2)
SODIUM SERPL-SCNC: 143 MMOL/L (ref 136–145)

## 2019-08-14 ENCOUNTER — OFFICE VISIT (OUTPATIENT)
Dept: FAMILY MEDICINE CLINIC | Facility: CLINIC | Age: 64
End: 2019-08-14

## 2019-08-14 VITALS
BODY MASS INDEX: 25.44 KG/M2 | HEIGHT: 64 IN | RESPIRATION RATE: 16 BRPM | SYSTOLIC BLOOD PRESSURE: 128 MMHG | TEMPERATURE: 99.2 F | DIASTOLIC BLOOD PRESSURE: 73 MMHG | HEART RATE: 73 BPM | WEIGHT: 149 LBS | OXYGEN SATURATION: 100 %

## 2019-08-14 DIAGNOSIS — J06.9 ACUTE URI: ICD-10-CM

## 2019-08-14 DIAGNOSIS — J02.9 SORE THROAT: Primary | ICD-10-CM

## 2019-08-14 LAB
EXPIRATION DATE: NORMAL
INTERNAL CONTROL: NORMAL
Lab: NORMAL
S PYO AG THROAT QL: NEGATIVE

## 2019-08-14 PROCEDURE — 87880 STREP A ASSAY W/OPTIC: CPT | Performed by: PHYSICIAN ASSISTANT

## 2019-08-14 PROCEDURE — 99213 OFFICE O/P EST LOW 20 MIN: CPT | Performed by: PHYSICIAN ASSISTANT

## 2019-08-14 RX ORDER — FLUCONAZOLE 150 MG/1
150 TABLET ORAL ONCE
Qty: 1 TABLET | Refills: 2 | Status: SHIPPED | OUTPATIENT
Start: 2019-08-14 | End: 2019-08-14

## 2019-08-14 RX ORDER — AZITHROMYCIN 250 MG/1
TABLET, FILM COATED ORAL
Qty: 6 TABLET | Refills: 1 | Status: SHIPPED | OUTPATIENT
Start: 2019-08-14 | End: 2019-12-04

## 2019-08-14 NOTE — PROGRESS NOTES
Subjective   Boby Robles is a 64 y.o. female.     History of Present Illness   Boby Robles 64 y.o. female who presents for evaluation of upper respiratory congestion, sore throat, ear pressure, fatigue. Symptoms include ear pressure, congestion, sore throat, post nasal drip, fatigue, hoarseness and discharge from eye(s).  Onset of symptoms was 1 week ago, unchanged since that time. Patient denies shortness of breath, wheezing.   Evaluation to date: none Treatment to date:  honey.         The following portions of the patient's history were reviewed and updated as appropriate: allergies, current medications, past family history, past medical history, past social history, past surgical history and problem list.    Review of Systems   Constitutional: Positive for fatigue. Negative for activity change and unexpected weight change.   HENT: Positive for congestion, ear pain, postnasal drip, sore throat and voice change. Ear discharge: ear pressure.    Eyes: Positive for discharge. Negative for pain.   Respiratory: Positive for cough. Negative for chest tightness, shortness of breath and wheezing.    Cardiovascular: Negative for chest pain and palpitations.   Gastrointestinal: Negative for abdominal pain, diarrhea and vomiting.   Endocrine: Negative.    Genitourinary: Negative.    Musculoskeletal: Negative for joint swelling.   Skin: Negative for color change, rash and wound.   Allergic/Immunologic: Negative.    Neurological: Negative for seizures and syncope.   Psychiatric/Behavioral: Negative.        Objective   Physical Exam   Constitutional: She is oriented to person, place, and time. She appears well-developed and well-nourished.  Non-toxic appearance. No distress.   HENT:   Head: Normocephalic and atraumatic. Hair is normal.   Right Ear: External ear normal. No drainage, swelling or tenderness. Tympanic membrane is retracted.   Left Ear: External ear normal. No drainage, swelling or tenderness. Tympanic  membrane is retracted.   Nose: Mucosal edema present. No epistaxis.   Mouth/Throat: Uvula is midline and mucous membranes are normal. No oral lesions. No uvula swelling. Posterior oropharyngeal erythema present. No oropharyngeal exudate.   Left tonsil speckled white but not exudate   Eyes: Conjunctivae and EOM are normal. Pupils are equal, round, and reactive to light. Right eye exhibits no discharge. Left eye exhibits no discharge. No scleral icterus.   Neck: Normal range of motion. Neck supple.   Cardiovascular: Normal rate, regular rhythm and normal heart sounds. Exam reveals no gallop.   No murmur heard.  Pulmonary/Chest: Breath sounds normal. No stridor. No respiratory distress. She has no wheezes. She has no rales. She exhibits no tenderness.   Abdominal: Soft. There is no tenderness.   Lymphadenopathy:     She has cervical adenopathy.   Neurological: She is alert and oriented to person, place, and time. She exhibits normal muscle tone.   Skin: Skin is warm and dry. No rash noted. She is not diaphoretic.   Psychiatric: She has a normal mood and affect. Her behavior is normal. Judgment and thought content normal.   Nursing note and vitals reviewed.      Assessment/Plan   Boby was seen today for sinusitis.    Diagnoses and all orders for this visit:    Sore throat  -     POC Rapid Strep A      Start Zpak  Do yvon Maalox and Benadryl and spit    Will get labs and check mono if not better few days

## 2019-08-15 NOTE — TELEPHONE ENCOUNTER
Pt is needing a refill premarin. Is it okay to refill for a year.    Skin Substitute Text: The defect edges were debeveled with a #15 scalpel blade.  Given the location of the defect, shape of the defect and the proximity to free margins a skin substitute graft was deemed most appropriate.  The graft material was trimmed to fit the size of the defect. The graft was then placed in the primary defect and oriented appropriately.

## 2019-11-18 DIAGNOSIS — I10 ESSENTIAL HYPERTENSION: ICD-10-CM

## 2019-11-18 DIAGNOSIS — I10 ESSENTIAL HYPERTENSION: Primary | ICD-10-CM

## 2019-11-18 RX ORDER — TELMISARTAN AND AMLODIPINE 5; 80 MG/1; MG/1
TABLET ORAL
Qty: 90 TABLET | Refills: 0 | Status: SHIPPED | OUTPATIENT
Start: 2019-11-18 | End: 2019-12-04 | Stop reason: SDUPTHER

## 2019-11-18 RX ORDER — TELMISARTAN AND AMLODIPINE 5; 80 MG/1; MG/1
1 TABLET ORAL DAILY
Qty: 30 TABLET | Refills: 0 | Status: SHIPPED | OUTPATIENT
Start: 2019-11-18 | End: 2019-11-18 | Stop reason: SDUPTHER

## 2019-12-04 ENCOUNTER — OFFICE VISIT (OUTPATIENT)
Dept: FAMILY MEDICINE CLINIC | Facility: CLINIC | Age: 64
End: 2019-12-04

## 2019-12-04 VITALS
HEIGHT: 64 IN | OXYGEN SATURATION: 100 % | RESPIRATION RATE: 16 BRPM | SYSTOLIC BLOOD PRESSURE: 120 MMHG | HEART RATE: 70 BPM | TEMPERATURE: 97.8 F | WEIGHT: 148 LBS | DIASTOLIC BLOOD PRESSURE: 62 MMHG | BODY MASS INDEX: 25.27 KG/M2

## 2019-12-04 DIAGNOSIS — E55.9 VITAMIN D DEFICIENCY: ICD-10-CM

## 2019-12-04 DIAGNOSIS — Z00.00 LABORATORY EXAMINATION ORDERED AS PART OF A ROUTINE GENERAL MEDICAL EXAMINATION: ICD-10-CM

## 2019-12-04 DIAGNOSIS — I10 ESSENTIAL HYPERTENSION: Primary | ICD-10-CM

## 2019-12-04 DIAGNOSIS — Z79.890 HORMONE REPLACEMENT THERAPY (HRT): ICD-10-CM

## 2019-12-04 PROCEDURE — 99213 OFFICE O/P EST LOW 20 MIN: CPT | Performed by: PHYSICIAN ASSISTANT

## 2019-12-04 RX ORDER — TELMISARTAN AND AMLODIPINE 5; 80 MG/1; MG/1
1 TABLET ORAL DAILY
Qty: 90 TABLET | Refills: 1 | Status: SHIPPED | OUTPATIENT
Start: 2019-12-04 | End: 2020-05-04

## 2019-12-04 NOTE — PATIENT INSTRUCTIONS

## 2019-12-04 NOTE — PROGRESS NOTES
"Subjective   Boby Robles is a 64 y.o. female.     History of Present Illness    Since the last visit, she has overall felt great since saw ortho--injected knee and shoulder right.  She has Essential Hypertension and well controlled on current medication and Vitamin D deficiency and will update labs for continued management.  she has been compliant with current medications have reviewed them.  The patient denies medication side effects.  Will refill medications. /62 (BP Location: Left arm, Patient Position: Sitting, Cuff Size: Large Adult)   Pulse 70   Temp 97.8 °F (36.6 °C) (Oral)   Resp 16   Ht 162.6 cm (64\")   Wt 67.1 kg (148 lb)   LMP  (LMP Unknown)   SpO2 100%   BMI 25.40 kg/m²     Results for orders placed or performed in visit on 08/14/19   POC Rapid Strep A   Result Value Ref Range    Rapid Strep A Screen Negative Negative, VALID, INVALID, Not Performed    Internal Control Passed Passed    Lot Number [id number]     Expiration Date 03/31/21    having some pnd    Wants to cont on HRT; did talk about going down on dose  Declines DEXA  Watching CKD  Update labs  Sees ortho  walking  The following portions of the patient's history were reviewed and updated as appropriate: allergies, current medications, past family history, past medical history, past social history, past surgical history and problem list.    Review of Systems   Constitutional: Negative for activity change, appetite change and unexpected weight change.   HENT: Positive for postnasal drip. Negative for nosebleeds and trouble swallowing.    Eyes: Negative for pain and visual disturbance.   Respiratory: Negative for chest tightness, shortness of breath and wheezing.    Cardiovascular: Negative for chest pain and palpitations.   Gastrointestinal: Negative for abdominal pain and blood in stool.   Endocrine: Negative.    Genitourinary: Negative for difficulty urinating and hematuria.   Musculoskeletal: Negative for joint swelling. "   Skin: Negative for color change and rash.   Allergic/Immunologic: Negative.    Neurological: Negative for syncope and speech difficulty.   Hematological: Negative for adenopathy.   Psychiatric/Behavioral: Negative for agitation and confusion.   All other systems reviewed and are negative.      Objective   Physical Exam   Constitutional: She is oriented to person, place, and time. She appears well-developed and well-nourished. No distress.   HENT:   Head: Normocephalic and atraumatic.   pnd   Eyes: Conjunctivae and EOM are normal. Pupils are equal, round, and reactive to light. Right eye exhibits no discharge. Left eye exhibits no discharge. No scleral icterus.   Neck: Normal range of motion. Neck supple. No tracheal deviation present. No thyromegaly present.   Cardiovascular: Normal rate, regular rhythm, normal heart sounds, intact distal pulses and normal pulses. Exam reveals no gallop.   No murmur heard.  Pulmonary/Chest: Effort normal and breath sounds normal. No respiratory distress. She has no wheezes. She has no rales.   Musculoskeletal: Normal range of motion.   Neurological: She is alert and oriented to person, place, and time. She exhibits normal muscle tone. Coordination normal.   Skin: Skin is warm. No rash noted. No erythema. No pallor.   Psychiatric: She has a normal mood and affect. Her behavior is normal. Judgment and thought content normal.   Nursing note and vitals reviewed.      Assessment/Plan   Boby was seen today for hypertension and med management.    Diagnoses and all orders for this visit:    Essential hypertension  -     Comprehensive metabolic panel  -     Lipid panel  -     CBC and Differential  -     TSH  -     T3, Free  -     T4, Free  -     Vitamin B12  -     Folate  -     Vitamin D 25 Hydroxy  -     telmisartan-amLODIPine (TWYNSTA) 80-5 MG per tablet; Take 1 tablet by mouth Daily. for blood pressure    Vitamin D deficiency  -     Comprehensive metabolic panel  -     Lipid panel  -      CBC and Differential  -     TSH  -     T3, Free  -     T4, Free  -     Vitamin B12  -     Folate  -     Vitamin D 25 Hydroxy    Laboratory examination ordered as part of a routine general medical examination  -     Comprehensive metabolic panel  -     Lipid panel  -     CBC and Differential  -     TSH  -     T3, Free  -     T4, Free  -     Vitamin B12  -     Folate  -     Vitamin D 25 Hydroxy    Hormone replacement therapy (HRT)      Plan, Boby Robles, was seen today.  she was seen for HTN and continue medication and Vitamin D deficiency and will update labs .  Desires to stay on HRT

## 2019-12-09 LAB
25(OH)D3+25(OH)D2 SERPL-MCNC: 29.7 NG/ML (ref 30–100)
ALBUMIN SERPL-MCNC: 4 G/DL (ref 3.6–4.8)
ALBUMIN/GLOB SERPL: 1.5 {RATIO} (ref 1.2–2.2)
ALP SERPL-CCNC: 64 IU/L (ref 39–117)
ALT SERPL-CCNC: 18 IU/L (ref 0–32)
AST SERPL-CCNC: 21 IU/L (ref 0–40)
BASOPHILS # BLD AUTO: 0 X10E3/UL (ref 0–0.2)
BASOPHILS NFR BLD AUTO: 1 %
BILIRUB SERPL-MCNC: 0.4 MG/DL (ref 0–1.2)
BUN SERPL-MCNC: 13 MG/DL (ref 8–27)
BUN/CREAT SERPL: 12 (ref 12–28)
CALCIUM SERPL-MCNC: 9.7 MG/DL (ref 8.7–10.3)
CHLORIDE SERPL-SCNC: 107 MMOL/L (ref 96–106)
CHOLEST SERPL-MCNC: 211 MG/DL (ref 100–199)
CO2 SERPL-SCNC: 22 MMOL/L (ref 20–29)
CREAT SERPL-MCNC: 1.1 MG/DL (ref 0.57–1)
EOSINOPHIL # BLD AUTO: 0.1 X10E3/UL (ref 0–0.4)
EOSINOPHIL NFR BLD AUTO: 1 %
ERYTHROCYTE [DISTWIDTH] IN BLOOD BY AUTOMATED COUNT: 12.7 % (ref 12.3–15.4)
FOLATE SERPL-MCNC: 18.3 NG/ML
GLOBULIN SER CALC-MCNC: 2.7 G/DL (ref 1.5–4.5)
GLUCOSE SERPL-MCNC: 94 MG/DL (ref 65–99)
HCT VFR BLD AUTO: 38.9 % (ref 34–46.6)
HDLC SERPL-MCNC: 92 MG/DL
HGB BLD-MCNC: 12.7 G/DL (ref 11.1–15.9)
IMM GRANULOCYTES # BLD AUTO: 0 X10E3/UL (ref 0–0.1)
IMM GRANULOCYTES NFR BLD AUTO: 0 %
LDLC SERPL CALC-MCNC: 106 MG/DL (ref 0–99)
LYMPHOCYTES # BLD AUTO: 1.9 X10E3/UL (ref 0.7–3.1)
LYMPHOCYTES NFR BLD AUTO: 35 %
MCH RBC QN AUTO: 30.2 PG (ref 26.6–33)
MCHC RBC AUTO-ENTMCNC: 32.6 G/DL (ref 31.5–35.7)
MCV RBC AUTO: 93 FL (ref 79–97)
MONOCYTES # BLD AUTO: 0.3 X10E3/UL (ref 0.1–0.9)
MONOCYTES NFR BLD AUTO: 6 %
NEUTROPHILS # BLD AUTO: 3.2 X10E3/UL (ref 1.4–7)
NEUTROPHILS NFR BLD AUTO: 57 %
PLATELET # BLD AUTO: 258 X10E3/UL (ref 150–450)
POTASSIUM SERPL-SCNC: 4.6 MMOL/L (ref 3.5–5.2)
PROT SERPL-MCNC: 6.7 G/DL (ref 6–8.5)
RBC # BLD AUTO: 4.2 X10E6/UL (ref 3.77–5.28)
SODIUM SERPL-SCNC: 145 MMOL/L (ref 134–144)
T3FREE SERPL-MCNC: 3.1 PG/ML (ref 2–4.4)
T4 FREE SERPL-MCNC: 1.33 NG/DL (ref 0.82–1.77)
TRIGL SERPL-MCNC: 63 MG/DL (ref 0–149)
TSH SERPL DL<=0.005 MIU/L-ACNC: 1.65 UIU/ML (ref 0.45–4.5)
VIT B12 SERPL-MCNC: 625 PG/ML (ref 232–1245)
VLDLC SERPL CALC-MCNC: 13 MG/DL (ref 5–40)
WBC # BLD AUTO: 5.6 X10E3/UL (ref 3.4–10.8)

## 2020-05-04 ENCOUNTER — OFFICE VISIT (OUTPATIENT)
Dept: FAMILY MEDICINE CLINIC | Facility: CLINIC | Age: 65
End: 2020-05-04

## 2020-05-04 DIAGNOSIS — E78.2 HYPERLIPIDEMIA, MIXED: ICD-10-CM

## 2020-05-04 DIAGNOSIS — E55.9 VITAMIN D DEFICIENCY: ICD-10-CM

## 2020-05-04 DIAGNOSIS — I10 ESSENTIAL HYPERTENSION: ICD-10-CM

## 2020-05-04 DIAGNOSIS — R07.81 RIB PAIN: Primary | ICD-10-CM

## 2020-05-04 DIAGNOSIS — M79.7 FIBROMYALGIA: ICD-10-CM

## 2020-05-04 DIAGNOSIS — G89.29 CHRONIC RIGHT FLANK PAIN: ICD-10-CM

## 2020-05-04 DIAGNOSIS — R10.9 CHRONIC RIGHT FLANK PAIN: ICD-10-CM

## 2020-05-04 PROCEDURE — 99214 OFFICE O/P EST MOD 30 MIN: CPT | Performed by: PHYSICIAN ASSISTANT

## 2020-05-04 RX ORDER — TELMISARTAN AND AMLODIPINE 5; 80 MG/1; MG/1
1 TABLET ORAL DAILY
Qty: 90 TABLET | Refills: 1 | Status: SHIPPED | OUTPATIENT
Start: 2020-05-04 | End: 2020-11-03 | Stop reason: SDUPTHER

## 2020-05-04 NOTE — PATIENT INSTRUCTIONS
Call [facility] about colonoscopy---3-2015 last done and ? 5 years ago.  [phone]    Dyslipidemia  Dyslipidemia is an imbalance of waxy, fat-like substances (lipids) in the blood. The body needs lipids in small amounts. Dyslipidemia often involves a high level of cholesterol or triglycerides, which are types of lipids.  Common forms of dyslipidemia include:  · High levels of LDL cholesterol. LDL is the type of cholesterol that causes fatty deposits (plaques) to build up in the blood vessels that carry blood away from your heart (arteries).  · Low levels of HDL cholesterol. HDL cholesterol is the type of cholesterol that protects against heart disease. High levels of HDL remove the LDL buildup from arteries.  · High levels of triglycerides. Triglycerides are a fatty substance in the blood that is linked to a buildup of plaques in the arteries.  What are the causes?  Primary dyslipidemia is caused by changes (mutations) in genes that are passed down through families (inherited). These mutations cause several types of dyslipidemia.  Secondary dyslipidemia is caused by lifestyle choices and diseases that lead to dyslipidemia, such as:  · Eating a diet that is high in animal fat.  · Not getting enough exercise.  · Having diabetes, kidney disease, liver disease, or thyroid disease.  · Drinking large amounts of alcohol.  · Using certain medicines.  What increases the risk?  You are more likely to develop this condition if you are an older man or if you are a woman who has gone through menopause. Other risk factors include:  · Having a family history of dyslipidemia.  · Taking certain medicines, including birth control pills, steroids, some diuretics, and beta-blockers.  · Smoking cigarettes.  · Eating a high-fat diet.  · Having certain medical conditions such as diabetes, polycystic ovary syndrome (PCOS), kidney disease, liver disease, or hypothyroidism.  · Not exercising regularly.  · Being overweight or obese with too much  belly fat.  What are the signs or symptoms?  In most cases, dyslipidemia does not usually cause any symptoms.  In severe cases, very high lipid levels can cause:  · Fatty bumps under the skin (xanthomas).  · White or gray ring around the black center (pupil) of the eye.  Very high triglyceride levels can cause inflammation of the pancreas (pancreatitis).  How is this diagnosed?  Your health care provider may diagnose dyslipidemia based on a routine blood test (fasting blood test). Because most people do not have symptoms of the condition, this blood testing (lipid profile) is done on adults age 20 and older and is repeated every 5 years. This test checks:  · Total cholesterol. This measures the total amount of cholesterol in your blood, including LDL cholesterol, HDL cholesterol, and triglycerides. A healthy number is below 200.  · LDL cholesterol. The target number for LDL cholesterol is different for each person, depending on individual risk factors. Ask your health care provider what your LDL cholesterol should be.  · HDL cholesterol. An HDL level of 60 or higher is best because it helps to protect against heart disease. A number below 40 for men or below 50 for women increases the risk for heart disease.  · Triglycerides. A healthy triglyceride number is below 150.  If your lipid profile is abnormal, your health care provider may do other blood tests.  How is this treated?  Treatment depends on the type of dyslipidemia that you have and your other risk factors for heart disease and stroke. Your health care provider will have a target range for your lipid levels based on this information.  For many people, this condition may be treated by lifestyle changes, such as diet and exercise. Your health care provider may recommend that you:  · Get regular exercise.  · Make changes to your diet.  · Quit smoking if you smoke.  If diet changes and exercise do not help you reach your goals, your health care provider may also  prescribe medicine to lower lipids. The most commonly prescribed type of medicine lowers your LDL cholesterol (statin drug). If you have a high triglyceride level, your provider may prescribe another type of drug (fibrate) or an omega-3 fish oil supplement, or both.  Follow these instructions at home:    Eating and drinking  · Follow instructions from your health care provider or dietitian about eating or drinking restrictions.  · Eat a healthy diet as told by your health care provider. This can help you reach and maintain a healthy weight, lower your LDL cholesterol, and raise your HDL cholesterol. This may include:  ? Limiting your calories, if you are overweight.  ? Eating more fruits, vegetables, whole grains, fish, and lean meats.  ? Limiting saturated fat, trans fat, and cholesterol.  · If you drink alcohol:  ? Limit how much you use.  ? Be aware of how much alcohol is in your drink. In the [place] one drink equals one 12 oz bottle of beer (355 mL), one 5 oz glass of wine (148 mL), or one 1½ oz glass of hard liquor (44 mL).  · Do not drink alcohol if:  ? Your health care provider tells you not to drink.  ? You are pregnant, may be pregnant, or are planning to become pregnant.  Activity  · Get regular exercise. Start an exercise and strength training program as told by your health care provider. Ask your health care provider what activities are safe for you. Your health care provider may recommend:  ? 30 minutes of aerobic activity 4-6 days a week. Brisk walking is an example of aerobic activity.  ? Strength training 2 days a week.  General instructions  · Do not use any products that contain nicotine or tobacco, such as cigarettes, e-cigarettes, and chewing tobacco. If you need help quitting, ask your health care provider.  · Take over-the-counter and prescription medicines only as told by your health care provider. This includes supplements.  · Keep all follow-up visits as told by your health care  provider.  Contact a health care provider if:  · You are:  ? Having trouble sticking to your exercise or diet plan.  ? Struggling to quit smoking or control your use of alcohol.  Summary  · Dyslipidemia often involves a high level of cholesterol or triglycerides, which are types of lipids.  · Treatment depends on the type of dyslipidemia that you have and your other risk factors for heart disease and stroke.  · For many people, treatment starts with lifestyle changes, such as diet and exercise.  · Your health care provider may prescribe medicine to lower lipids.  This information is not intended to replace advice given to you by your health care provider. Make sure you discuss any questions you have with your health care provider.  Document Released: 12/23/2014 Document Revised: 08/12/2019 Document Reviewed: 07/19/2019  [employer] Interactive Patient Education © 2020 [employer]

## 2020-05-04 NOTE — PROGRESS NOTES
Subjective   Boby Robles is a 64 y.o. female.   You have chosen to receive care through a telehealth visit.  Do you consent to use a video/audio connection for your medical care today? Yes  Dox  History of Present Illness    Since the last visit, she has overall felt well.  She has Essential Hypertension and well controlled on current medication, Hyperlipidemia and working on this with diet and exercise and Vitamin D deficiency and labs are at goal >30 ng/mL.  she has been compliant with current medications have reviewed them.  The patient denies medication side effects.  Will refill medications. LMP  (LMP Unknown)   Last chol score was 6.7%  Watching renal labs; avoids NSAIDs  Dr Lerma for ortho    She is exercising; walking.  Results for orders placed or performed in visit on 12/04/19   Comprehensive metabolic panel   Result Value Ref Range    Glucose 94 65 - 99 mg/dL    BUN 13 8 - 27 mg/dL    Creatinine 1.10 (H) 0.57 - 1.00 mg/dL    eGFR Non African Am 53 (L) >59 mL/min/1.73    eGFR African Am 61 >59 mL/min/1.73    BUN/Creatinine Ratio 12 12 - 28    Sodium 145 (H) 134 - 144 mmol/L    Potassium 4.6 3.5 - 5.2 mmol/L    Chloride 107 (H) 96 - 106 mmol/L    Total CO2 22 20 - 29 mmol/L    Calcium 9.7 8.7 - 10.3 mg/dL    Total Protein 6.7 6.0 - 8.5 g/dL    Albumin 4.0 3.6 - 4.8 g/dL    Globulin 2.7 1.5 - 4.5 g/dL    A/G Ratio 1.5 1.2 - 2.2    Total Bilirubin 0.4 0.0 - 1.2 mg/dL    Alkaline Phosphatase 64 39 - 117 IU/L    AST (SGOT) 21 0 - 40 IU/L    ALT (SGPT) 18 0 - 32 IU/L   Lipid panel   Result Value Ref Range    Total Cholesterol 211 (H) 100 - 199 mg/dL    Triglycerides 63 0 - 149 mg/dL    HDL Cholesterol 92 >39 mg/dL    VLDL Cholesterol 13 5 - 40 mg/dL    LDL Cholesterol  106 (H) 0 - 99 mg/dL   TSH   Result Value Ref Range    TSH 1.650 0.450 - 4.500 uIU/mL   T3, Free   Result Value Ref Range    T3, Free 3.1 2.0 - 4.4 pg/mL   T4, Free   Result Value Ref Range    Free T4 1.33 0.82 - 1.77 ng/dL   Vitamin B12    Result Value Ref Range    Vitamin B-12 625 232 - 1,245 pg/mL   Folate   Result Value Ref Range    Folate 18.3 >3.0 ng/mL   Vitamin D 25 Hydroxy   Result Value Ref Range    25 Hydroxy, Vitamin D 29.7 (L) 30.0 - 100.0 ng/mL   CBC and Differential   Result Value Ref Range    WBC 5.6 3.4 - 10.8 x10E3/uL    RBC 4.20 3.77 - 5.28 x10E6/uL    Hemoglobin 12.7 11.1 - 15.9 g/dL    Hematocrit 38.9 34.0 - 46.6 %    MCV 93 79 - 97 fL    MCH 30.2 26.6 - 33.0 pg    MCHC 32.6 31.5 - 35.7 g/dL    RDW 12.7 12.3 - 15.4 %    Platelets 258 150 - 450 x10E3/uL    Neutrophil Rel % 57 Not Estab. %    Lymphocyte Rel % 35 Not Estab. %    Monocyte Rel % 6 Not Estab. %    Eosinophil Rel % 1 Not Estab. %    Basophil Rel % 1 Not Estab. %    Neutrophils Absolute 3.2 1.4 - 7.0 x10E3/uL    Lymphocytes Absolute 1.9 0.7 - 3.1 x10E3/uL    Monocytes Absolute 0.3 0.1 - 0.9 x10E3/uL    Eosinophils Absolute 0.1 0.0 - 0.4 x10E3/uL    Basophils Absolute 0.0 0.0 - 0.2 x10E3/uL    Immature Granulocyte Rel % 0 Not Estab. %    Immature Grans Absolute 0.0 0.0 - 0.1 x10E3/uL     Soreness in right side for mos--over a year; notice more if prolonged sitting and after prolonged walk. This is in right costal margin  Will refill her Rx  New ins in June  She cont. On Premarin;   Need her to call [facility] about colonoscopy; ? Due now? Up under rib cage; this chronic; ortho did check hip; will need CXR- and right rib detail--she can have this with her mammo. She will do at [facility];  Ortho did trigger point inj and helped for about 2 mos; ; no change with food; no nausea  Need xray.  Need urine and make sure neg; no hx renal stones.  The following portions of the patient's history were reviewed and updated as appropriate: allergies, current medications, past family history, past medical history, past social history, past surgical history and problem list.    Review of Systems   Constitutional: Negative for activity change, appetite change and unexpected weight change.   HENT:  Negative for nosebleeds and trouble swallowing.    Eyes: Negative for pain and visual disturbance.   Respiratory: Negative for chest tightness, shortness of breath and wheezing.    Cardiovascular: Negative for chest pain and palpitations.   Gastrointestinal: Negative for abdominal pain and blood in stool.   Endocrine: Negative.    Genitourinary: Negative for difficulty urinating and hematuria.   Musculoskeletal: Positive for arthralgias. Negative for joint swelling.   Skin: Negative for color change and rash.   Allergic/Immunologic: Negative.    Neurological: Negative for syncope and speech difficulty.   Hematological: Negative for adenopathy.   Psychiatric/Behavioral: Negative for agitation and confusion.   All other systems reviewed and are negative.      Objective   Physical Exam   Constitutional: She is oriented to person, place, and time. She appears well-developed and well-nourished. No distress.   HENT:   Head: Normocephalic and atraumatic.   Eyes: Pupils are equal, round, and reactive to light. Conjunctivae and EOM are normal. Right eye exhibits no discharge. Left eye exhibits no discharge. No scleral icterus.   Neck: Normal range of motion. Neck supple. No tracheal deviation present. No thyromegaly present.   Cardiovascular: Normal rate, regular rhythm, normal heart sounds, intact distal pulses and normal pulses. Exam reveals no gallop.   No murmur heard.  Pulmonary/Chest: Effort normal and breath sounds normal. No respiratory distress. She has no wheezes. She has no rales.   Musculoskeletal: Normal range of motion.   Neurological: She is alert and oriented to person, place, and time. She exhibits normal muscle tone. Coordination normal.   Skin: Skin is warm. No rash noted. No erythema. No pallor.   Psychiatric: She has a normal mood and affect. Her behavior is normal. Judgment and thought content normal.   Nursing note and vitals reviewed.      Assessment/Plan   Boby was seen today for  hypertension.    Diagnoses and all orders for this visit:    Rib pain  -     Urinalysis With Microscopic - Urine, Clean Catch  -     Comprehensive metabolic panel  -     Lipid panel  -     XR Ribs Right With PA Chest; Future    Essential hypertension  -     telmisartan-amLODIPine (TWYNSTA) 80-5 MG per tablet; Take 1 tablet by mouth Daily. for blood pressure  -     Urinalysis With Microscopic - Urine, Clean Catch  -     Comprehensive metabolic panel  -     Lipid panel  -     XR Ribs Right With PA Chest; Future    Chronic right flank pain  -     Urinalysis With Microscopic - Urine, Clean Catch  -     Comprehensive metabolic panel  -     Lipid panel  -     XR Ribs Right With PA Chest; Future    Other orders  -     estrogens, conjugated, (Premarin) 0.625 MG tablet; Take 1 tablet by mouth Daily. One PO daily      Need imaging and will check urine for blood; right flank pain.  Need her to have CXR and rib detail xray to work this right side pain up; no injury  Plan, Boby Robles, was seen today.  she was seen for HTN and continue medication, Hyperlipidemia and will work on this with diet and exercise and Vitamin D deficiency and supplemented.  If xray and urine neg; consider consult PT--- need follow-up on this in the next 3 weeks  Time spent 25 minutes

## 2020-05-05 LAB
ALBUMIN SERPL-MCNC: 4 G/DL (ref 3.5–5.2)
ALBUMIN/GLOB SERPL: 1.3 G/DL
ALP SERPL-CCNC: 63 U/L (ref 39–117)
ALT SERPL-CCNC: 19 U/L (ref 1–33)
APPEARANCE UR: CLEAR
AST SERPL-CCNC: 23 U/L (ref 1–32)
BACTERIA #/AREA URNS HPF: ABNORMAL /HPF
BILIRUB SERPL-MCNC: 0.3 MG/DL (ref 0.2–1.2)
BILIRUB UR QL STRIP: NEGATIVE
BUN SERPL-MCNC: 12 MG/DL (ref 8–23)
BUN/CREAT SERPL: 11.8 (ref 7–25)
CALCIUM SERPL-MCNC: 9.7 MG/DL (ref 8.6–10.5)
CASTS URNS MICRO: ABNORMAL
CHLORIDE SERPL-SCNC: 104 MMOL/L (ref 98–107)
CHOLEST SERPL-MCNC: 202 MG/DL (ref 0–200)
CO2 SERPL-SCNC: 27.5 MMOL/L (ref 22–29)
COLOR UR: YELLOW
CREAT SERPL-MCNC: 1.02 MG/DL (ref 0.57–1)
EPI CELLS #/AREA URNS HPF: ABNORMAL /HPF
GLOBULIN SER CALC-MCNC: 3.2 GM/DL
GLUCOSE SERPL-MCNC: 94 MG/DL (ref 65–99)
GLUCOSE UR QL: NEGATIVE
HDLC SERPL-MCNC: 85 MG/DL (ref 40–60)
HGB UR QL STRIP: NEGATIVE
KETONES UR QL STRIP: NEGATIVE
LDLC SERPL CALC-MCNC: 98 MG/DL (ref 0–100)
LEUKOCYTE ESTERASE UR QL STRIP: NEGATIVE
NITRITE UR QL STRIP: NEGATIVE
PH UR STRIP: 7.5 [PH] (ref 5–8)
POTASSIUM SERPL-SCNC: 4.5 MMOL/L (ref 3.5–5.2)
PROT SERPL-MCNC: 7.2 G/DL (ref 6–8.5)
PROT UR QL STRIP: NEGATIVE
RBC #/AREA URNS HPF: ABNORMAL /HPF
SODIUM SERPL-SCNC: 140 MMOL/L (ref 136–145)
SP GR UR: 1.02 (ref 1–1.03)
TRIGL SERPL-MCNC: 96 MG/DL (ref 0–150)
UROBILINOGEN UR STRIP-MCNC: NORMAL MG/DL
VLDLC SERPL CALC-MCNC: 19.2 MG/DL (ref 5–40)
WBC #/AREA URNS HPF: ABNORMAL /HPF

## 2020-05-11 NOTE — PROGRESS NOTES
Pt states with everything going on she will wait on physical tx and she feels better knowing the x-ray is neg

## 2020-08-28 ENCOUNTER — TELEPHONE (OUTPATIENT)
Dept: FAMILY MEDICINE CLINIC | Facility: CLINIC | Age: 65
End: 2020-08-28

## 2020-08-28 NOTE — TELEPHONE ENCOUNTER
----- Message from Boby Robles sent at 8/28/2020 12:16 PM EDT -----  Regarding: RE: Prescription Question  Contact: [phone]  I just contacted the insurance company and they did approve the appeal this morning. Dani montague/Natalya indicated that the Appeals Dept approved the Premarin for the 90 day qty. Natalya said they approved and back dated this from 8/21/2020 until 8/28/2021. The reference  #[id number] relates to this appeal. Dani Hoang indicated that Gina can now send in a new RX for the Premarin 90 day supply. My new pharmacy is [address]. ([phone])  Thank you for your attention regarding this situation.  Respectfully,  Boby Robles  ----- Message -----  From: Yisel PEACOCK  Sent: 8/28/2020 10:44 AM EDT  To: Boby Robles  Subject: RE: Prescription Question  This request was sent to your insurance on 8/26/20, and also again this morning.  We have not received a response from your insurance.    Thank you.    ----- Message -----     From: Boby Robles     Sent: 8/28/2020  9:59 AM EDT       To: Gina Valentino PA-C  Subject: RE: Prescription Question    Would someone please advise on the status of this request. I received paperwork regarding the denial of this preauthorization and have been trying to just get followup info. Natalya has stated that they are still trying to get this from your office!    Respectfully,  Boby  ----- Message -----  From: Clotilde ENG  Sent: 8/25/2020  1:18 PM EDT  To: Boby Robles  Subject: RE: Prescription Question  This was just received today and is in the stack to submit to insurance for approval    ----- Message -----     From: Boby Robles     Sent: 8/25/2020  9:55 AM EDT       To: Gina Valentino PA-C  Subject: Prescription Question    Gina  I have been trying since last week to get a pre authorization for my Premarin. My insurance changed and no one from your office has contacted me. [facility] and my new  insurance has denied the 90 day  refill due to your office not contacting them. [facility] did authorize a 30 day for now . I do not see you again until Nov. and just spoke w/Natalya and they indicated that the doctor now has to appeal. Please advise when your office/staff has rectified this issue. I will p/up the  30 day supply because I only have 4 tabs left.  Thank you for your atttention to this matter.  Boby

## 2020-10-03 ENCOUNTER — FLU SHOT (OUTPATIENT)
Dept: FAMILY MEDICINE CLINIC | Facility: CLINIC | Age: 65
End: 2020-10-03

## 2020-10-03 DIAGNOSIS — Z23 NEED FOR INFLUENZA VACCINATION: ICD-10-CM

## 2020-10-03 PROCEDURE — 90471 IMMUNIZATION ADMIN: CPT | Performed by: NURSE PRACTITIONER

## 2020-10-03 PROCEDURE — 90694 VACC AIIV4 NO PRSRV 0.5ML IM: CPT | Performed by: NURSE PRACTITIONER

## 2020-11-03 ENCOUNTER — OFFICE VISIT (OUTPATIENT)
Dept: FAMILY MEDICINE CLINIC | Facility: CLINIC | Age: 65
End: 2020-11-03

## 2020-11-03 VITALS
HEIGHT: 64 IN | TEMPERATURE: 97.1 F | HEART RATE: 70 BPM | DIASTOLIC BLOOD PRESSURE: 80 MMHG | BODY MASS INDEX: 26.29 KG/M2 | SYSTOLIC BLOOD PRESSURE: 134 MMHG | WEIGHT: 154 LBS

## 2020-11-03 DIAGNOSIS — Z00.00 LABORATORY EXAMINATION ORDERED AS PART OF A ROUTINE GENERAL MEDICAL EXAMINATION: ICD-10-CM

## 2020-11-03 DIAGNOSIS — I10 ESSENTIAL HYPERTENSION: Primary | ICD-10-CM

## 2020-11-03 DIAGNOSIS — G89.29 CHRONIC PAIN OF RIGHT KNEE: ICD-10-CM

## 2020-11-03 DIAGNOSIS — E78.2 HYPERLIPIDEMIA, MIXED: ICD-10-CM

## 2020-11-03 DIAGNOSIS — E55.9 VITAMIN D DEFICIENCY: ICD-10-CM

## 2020-11-03 DIAGNOSIS — N28.9 RENAL INSUFFICIENCY: ICD-10-CM

## 2020-11-03 DIAGNOSIS — M79.7 FIBROMYALGIA: ICD-10-CM

## 2020-11-03 DIAGNOSIS — M25.561 CHRONIC PAIN OF RIGHT KNEE: ICD-10-CM

## 2020-11-03 PROCEDURE — 99213 OFFICE O/P EST LOW 20 MIN: CPT | Performed by: PHYSICIAN ASSISTANT

## 2020-11-03 PROCEDURE — 90732 PPSV23 VACC 2 YRS+ SUBQ/IM: CPT | Performed by: PHYSICIAN ASSISTANT

## 2020-11-03 PROCEDURE — G0009 ADMIN PNEUMOCOCCAL VACCINE: HCPCS | Performed by: PHYSICIAN ASSISTANT

## 2020-11-03 RX ORDER — MELATONIN
1000 DAILY
COMMUNITY

## 2020-11-03 RX ORDER — TELMISARTAN AND AMLODIPINE 5; 80 MG/1; MG/1
1 TABLET ORAL DAILY
Qty: 90 TABLET | Refills: 1 | Status: SHIPPED | OUTPATIENT
Start: 2020-11-03 | End: 2021-05-07

## 2020-11-03 RX ORDER — VITAMIN E 268 MG
400 CAPSULE ORAL DAILY
COMMUNITY

## 2020-11-03 NOTE — PROGRESS NOTES
"Subjective   Boby Robles is a 65 y.o. female.     History of Present Illness    Since the last visit, she has overall felt well.  She has Essential Hypertension and well controlled on current medication, Hyperlipidemia and working on this with diet and exercise and Vitamin D deficiency and will update labs for continued management.  she has been compliant with current medications have reviewed them.  The patient denies medication side effects.  Will refill medications. /82   Pulse 70   Temp 97.1 °F (36.2 °C)   Ht 162.6 cm (64.02\")   Wt 69.9 kg (154 lb)   LMP  (LMP Unknown)   BMI 26.42 kg/m²   Has fibro pain; rare 1/2 tab Mobic once a mo----still want her to avoid NSAIDs  Need to see ortho for right knee--Dr Lerma  Recent right shoulder pain--injected Nov 2019  Results for orders placed or performed in visit on 05/04/20   Comprehensive metabolic panel    Specimen: Blood   Result Value Ref Range    Glucose 94 65 - 99 mg/dL    BUN 12 8 - 23 mg/dL    Creatinine 1.02 (H) 0.57 - 1.00 mg/dL    eGFR Non African Am 55 (L) >60 mL/min/1.73    eGFR African Am 66 >60 mL/min/1.73    BUN/Creatinine Ratio 11.8 7.0 - 25.0    Sodium 140 136 - 145 mmol/L    Potassium 4.5 3.5 - 5.2 mmol/L    Chloride 104 98 - 107 mmol/L    Total CO2 27.5 22.0 - 29.0 mmol/L    Calcium 9.7 8.6 - 10.5 mg/dL    Total Protein 7.2 6.0 - 8.5 g/dL    Albumin 4.00 3.50 - 5.20 g/dL    Globulin 3.2 gm/dL    A/G Ratio 1.3 g/dL    Total Bilirubin 0.3 0.2 - 1.2 mg/dL    Alkaline Phosphatase 63 39 - 117 U/L    AST (SGOT) 23 1 - 32 U/L    ALT (SGPT) 19 1 - 33 U/L   Lipid panel    Specimen: Blood   Result Value Ref Range    Total Cholesterol 202 (H) 0 - 200 mg/dL    Triglycerides 96 0 - 150 mg/dL    HDL Cholesterol 85 (H) 40 - 60 mg/dL    VLDL Cholesterol 19.2 5 - 40 mg/dL    LDL Cholesterol  98 0 - 100 mg/dL   Microscopic Examination -   Result Value Ref Range    WBC, UA 0-2 /HPF    RBC, UA 0-2 /HPF    Epithelial Cells (non renal) 3-6 (A) /HPF    Cast " Type Comment     Bacteria, UA 2+ (A) None Seen /HPF   Urinalysis With Microscopic - Urine, Clean Catch    Specimen: Urine, Clean Catch   Result Value Ref Range    Specific Gravity, UA 1.018 1.005 - 1.030    pH, UA 7.5 5.0 - 8.0    Color, UA Yellow     Appearance, UA Clear Clear    Leukocytes, UA Negative Negative    Protein Negative Negative    Glucose, UA Negative Negative    Ketones Negative Negative    Blood, UA Negative Negative    Bilirubin, UA Negative Negative    Urobilinogen, UA Comment     Nitrite, UA Negative Negative       Watching her renal labs-----renal insuff  Avoids NSAIDs    Still sore right side----? Gait r/t to right knee  Labs due Dec    Last chol score 6.5%  Failed Savella and Cymbalta    The following portions of the patient's history were reviewed and updated as appropriate: allergies, current medications, past family history, past medical history, past social history, past surgical history and problem list.    Review of Systems   Constitutional: Positive for fatigue. Negative for activity change, appetite change and unexpected weight change.   HENT: Negative for nosebleeds and trouble swallowing.    Eyes: Negative for pain and visual disturbance.   Respiratory: Negative for chest tightness, shortness of breath and wheezing.    Cardiovascular: Negative for chest pain and palpitations.   Gastrointestinal: Negative for abdominal pain and blood in stool.   Endocrine: Negative.    Genitourinary: Negative for difficulty urinating and hematuria.   Musculoskeletal: Positive for arthralgias and myalgias. Negative for joint swelling and neck pain.   Skin: Negative for color change and rash.   Allergic/Immunologic: Negative.    Neurological: Negative for syncope, speech difficulty and headaches.   Hematological: Negative for adenopathy.   Psychiatric/Behavioral: Negative for agitation, confusion, dysphoric mood and sleep disturbance.   All other systems reviewed and are negative.      Objective    Physical Exam  Vitals signs and nursing note reviewed.   Constitutional:       General: She is not in acute distress.     Appearance: Normal appearance. She is well-developed. She is not ill-appearing, toxic-appearing or diaphoretic.   HENT:      Head: Normocephalic.      Right Ear: External ear normal.      Left Ear: External ear normal.      Nose: Nose normal.      Mouth/Throat:      Pharynx: Oropharynx is clear.   Eyes:      General: No scleral icterus.     Conjunctiva/sclera: Conjunctivae normal.      Pupils: Pupils are equal, round, and reactive to light.   Neck:      Musculoskeletal: Normal range of motion and neck supple.      Thyroid: No thyromegaly.   Cardiovascular:      Rate and Rhythm: Normal rate and regular rhythm.      Pulses: Normal pulses.      Heart sounds: Normal heart sounds. No murmur.   Pulmonary:      Effort: Pulmonary effort is normal. No respiratory distress.      Breath sounds: Normal breath sounds. No rhonchi.   Musculoskeletal: Normal range of motion.         General: No deformity.      Right lower leg: No edema.      Left lower leg: No edema.   Skin:     General: Skin is warm.      Findings: No rash.   Neurological:      General: No focal deficit present.      Mental Status: She is alert and oriented to person, place, and time. Mental status is at baseline.   Psychiatric:         Mood and Affect: Mood normal.         Behavior: Behavior normal.         Thought Content: Thought content normal.         Judgment: Judgment normal.         Assessment/Plan   Diagnoses and all orders for this visit:    1. Essential hypertension (Primary)  -     Comprehensive metabolic panel; Future  -     Lipid panel; Future  -     CBC and Differential; Future  -     TSH; Future  -     T3, Free; Future  -     T4, Free; Future  -     Vitamin D 25 Hydroxy; Future  -     Vitamin B12; Future  -     Folate; Future    2. Fibromyalgia  -     Comprehensive metabolic panel; Future  -     Lipid panel; Future  -     CBC  and Differential; Future  -     TSH; Future  -     T3, Free; Future  -     T4, Free; Future  -     Vitamin D 25 Hydroxy; Future  -     Vitamin B12; Future  -     Folate; Future    3. Hyperlipidemia, mixed  -     Comprehensive metabolic panel; Future  -     Lipid panel; Future  -     CBC and Differential; Future  -     TSH; Future  -     T3, Free; Future  -     T4, Free; Future  -     Vitamin D 25 Hydroxy; Future  -     Vitamin B12; Future  -     Folate; Future    4. Vitamin D deficiency  -     Comprehensive metabolic panel; Future  -     Lipid panel; Future  -     CBC and Differential; Future  -     TSH; Future  -     T3, Free; Future  -     T4, Free; Future  -     Vitamin D 25 Hydroxy; Future  -     Vitamin B12; Future  -     Folate; Future    5. Laboratory examination ordered as part of a routine general medical examination  -     Comprehensive metabolic panel; Future  -     Lipid panel; Future  -     CBC and Differential; Future  -     TSH; Future  -     T3, Free; Future  -     T4, Free; Future  -     Vitamin D 25 Hydroxy; Future  -     Vitamin B12; Future  -     Folate; Future    6. Chronic pain of right knee  -     Comprehensive metabolic panel; Future  -     Lipid panel; Future  -     CBC and Differential; Future  -     TSH; Future  -     T3, Free; Future  -     T4, Free; Future  -     Vitamin D 25 Hydroxy; Future  -     Vitamin B12; Future  -     Folate; Future    Other orders  -     Pneumococcal Polysaccharide Vaccine 23-Valent Greater Than or Equal To 3yo Subcutaneous / IM    Plan, Boby Robles, was seen today.  she was seen for HTN and continue medication, Hyperlipidemia and will work on this with diet and exercise and Vitamin D deficiency and will update labs .      See DR Lerma for right knee         Answers for HPI/ROS submitted by the patient on 10/27/2020   Hypertension  What is the primary reason for your visit?: High Blood Pressure

## 2020-11-10 ENCOUNTER — TELEPHONE (OUTPATIENT)
Dept: FAMILY MEDICINE CLINIC | Facility: CLINIC | Age: 65
End: 2020-11-10

## 2020-11-10 DIAGNOSIS — D22.9 ATYPICAL NEVI: Primary | ICD-10-CM

## 2020-11-10 NOTE — TELEPHONE ENCOUNTER
Pt called requesting a ref.for Dermatology, She states she has a mole she wants them to check out.  Thanks.

## 2020-12-01 ENCOUNTER — RESULTS ENCOUNTER (OUTPATIENT)
Dept: FAMILY MEDICINE CLINIC | Facility: CLINIC | Age: 65
End: 2020-12-01

## 2020-12-01 DIAGNOSIS — I10 ESSENTIAL HYPERTENSION: ICD-10-CM

## 2020-12-01 DIAGNOSIS — E78.2 HYPERLIPIDEMIA, MIXED: ICD-10-CM

## 2020-12-01 DIAGNOSIS — G89.29 CHRONIC PAIN OF RIGHT KNEE: ICD-10-CM

## 2020-12-01 DIAGNOSIS — M25.561 CHRONIC PAIN OF RIGHT KNEE: ICD-10-CM

## 2020-12-01 DIAGNOSIS — E55.9 VITAMIN D DEFICIENCY: ICD-10-CM

## 2020-12-01 DIAGNOSIS — M79.7 FIBROMYALGIA: ICD-10-CM

## 2020-12-01 DIAGNOSIS — Z00.00 LABORATORY EXAMINATION ORDERED AS PART OF A ROUTINE GENERAL MEDICAL EXAMINATION: ICD-10-CM

## 2020-12-03 PROBLEM — N28.9 RENAL INSUFFICIENCY: Status: ACTIVE | Noted: 2020-12-03

## 2020-12-03 LAB
25(OH)D3+25(OH)D2 SERPL-MCNC: 44.1 NG/ML (ref 30–100)
ALBUMIN SERPL-MCNC: 3.9 G/DL (ref 3.5–5.2)
ALBUMIN/GLOB SERPL: 1.3 G/DL
ALP SERPL-CCNC: 63 U/L (ref 39–117)
ALT SERPL-CCNC: 18 U/L (ref 1–33)
AST SERPL-CCNC: 22 U/L (ref 1–32)
BASOPHILS # BLD AUTO: 0.08 10*3/MM3 (ref 0–0.2)
BASOPHILS NFR BLD AUTO: 1.5 % (ref 0–1.5)
BILIRUB SERPL-MCNC: 0.3 MG/DL (ref 0–1.2)
BUN SERPL-MCNC: 13 MG/DL (ref 8–23)
BUN/CREAT SERPL: 11.9 (ref 7–25)
CALCIUM SERPL-MCNC: 9.2 MG/DL (ref 8.6–10.5)
CHLORIDE SERPL-SCNC: 102 MMOL/L (ref 98–107)
CHOLEST SERPL-MCNC: 206 MG/DL (ref 0–200)
CO2 SERPL-SCNC: 23.9 MMOL/L (ref 22–29)
CREAT SERPL-MCNC: 1.09 MG/DL (ref 0.57–1)
EOSINOPHIL # BLD AUTO: 0.11 10*3/MM3 (ref 0–0.4)
EOSINOPHIL NFR BLD AUTO: 2.1 % (ref 0.3–6.2)
ERYTHROCYTE [DISTWIDTH] IN BLOOD BY AUTOMATED COUNT: 11.4 % (ref 12.3–15.4)
FOLATE SERPL-MCNC: 19.5 NG/ML (ref 4.78–24.2)
GLOBULIN SER CALC-MCNC: 3 GM/DL
GLUCOSE SERPL-MCNC: 94 MG/DL (ref 65–99)
HCT VFR BLD AUTO: 37.8 % (ref 34–46.6)
HDLC SERPL-MCNC: 89 MG/DL (ref 40–60)
HGB BLD-MCNC: 12.4 G/DL (ref 12–15.9)
IMM GRANULOCYTES # BLD AUTO: 0.01 10*3/MM3 (ref 0–0.05)
IMM GRANULOCYTES NFR BLD AUTO: 0.2 % (ref 0–0.5)
LDLC SERPL CALC-MCNC: 101 MG/DL (ref 0–100)
LYMPHOCYTES # BLD AUTO: 1.89 10*3/MM3 (ref 0.7–3.1)
LYMPHOCYTES NFR BLD AUTO: 36 % (ref 19.6–45.3)
MCH RBC QN AUTO: 29.9 PG (ref 26.6–33)
MCHC RBC AUTO-ENTMCNC: 32.8 G/DL (ref 31.5–35.7)
MCV RBC AUTO: 91.1 FL (ref 79–97)
MONOCYTES # BLD AUTO: 0.34 10*3/MM3 (ref 0.1–0.9)
MONOCYTES NFR BLD AUTO: 6.5 % (ref 5–12)
NEUTROPHILS # BLD AUTO: 2.82 10*3/MM3 (ref 1.7–7)
NEUTROPHILS NFR BLD AUTO: 53.7 % (ref 42.7–76)
NRBC BLD AUTO-RTO: 0 /100 WBC (ref 0–0.2)
PLATELET # BLD AUTO: 262 10*3/MM3 (ref 140–450)
POTASSIUM SERPL-SCNC: 4.1 MMOL/L (ref 3.5–5.2)
PROT SERPL-MCNC: 6.9 G/DL (ref 6–8.5)
RBC # BLD AUTO: 4.15 10*6/MM3 (ref 3.77–5.28)
SODIUM SERPL-SCNC: 134 MMOL/L (ref 136–145)
T3FREE SERPL-MCNC: 3.4 PG/ML (ref 2–4.4)
T4 FREE SERPL-MCNC: 1.34 NG/DL (ref 0.93–1.7)
TRIGL SERPL-MCNC: 91 MG/DL (ref 0–150)
TSH SERPL DL<=0.005 MIU/L-ACNC: 0.95 UIU/ML (ref 0.27–4.2)
VIT B12 SERPL-MCNC: 843 PG/ML (ref 211–946)
VLDLC SERPL CALC-MCNC: 16 MG/DL (ref 5–40)
WBC # BLD AUTO: 5.25 10*3/MM3 (ref 3.4–10.8)

## 2021-01-05 NOTE — TELEPHONE ENCOUNTER
Let her know and get OTC or can change PPI  
PA request for Lansoprazole denied; Plan Exclusion; please advise patient  
yes...

## 2021-03-08 ENCOUNTER — TELEPHONE (OUTPATIENT)
Dept: FAMILY MEDICINE CLINIC | Facility: CLINIC | Age: 66
End: 2021-03-08

## 2021-03-08 NOTE — TELEPHONE ENCOUNTER
PATIENT STATES THAT SHE HAS BEEN EXPERIENCING EAR PRESSURE AND RINGING FOR SEVERAL DAYS.  SHE STATES THAT SHE HAS TRIED SEVERAL OTC MEDICATIONS AND THERE'S BEEN NO RELIEF    PLEASE ADVISE     PATIENT CAN BE REACHED AT  [phone]     [facility]  [id number] [place] [place] - [address] AT [facility] [phone] [facility] [phone] FX

## 2021-03-09 NOTE — TELEPHONE ENCOUNTER
PATIENT HAS AN APPOINTMENT ON 3/15/21. IS THERE ANY WAY YOU CAN PRESCRIBE A MEDICATION FOR THIS BEFORE HER APPOINTMENT?  PLEASE ADVISE    PATIENT CALL BACK #: [phone] OR  [phone]    PHARMACY: [id number] [place] - [address] AT [facility] - [phone] [facility] [phone]

## 2021-03-10 ENCOUNTER — OFFICE VISIT (OUTPATIENT)
Dept: FAMILY MEDICINE CLINIC | Facility: CLINIC | Age: 66
End: 2021-03-10

## 2021-03-10 DIAGNOSIS — H93.13 TINNITUS OF BOTH EARS: ICD-10-CM

## 2021-03-10 DIAGNOSIS — J01.90 ACUTE SINUSITIS, RECURRENCE NOT SPECIFIED, UNSPECIFIED LOCATION: Primary | ICD-10-CM

## 2021-03-10 PROCEDURE — 99441 PR PHYS/QHP TELEPHONE EVALUATION 5-10 MIN: CPT | Performed by: PHYSICIAN ASSISTANT

## 2021-03-10 RX ORDER — PREDNISONE 10 MG/1
10 TABLET ORAL
Qty: 15 TABLET | Refills: 0 | Status: SHIPPED | OUTPATIENT
Start: 2021-03-10 | End: 2021-03-10 | Stop reason: SDUPTHER

## 2021-03-10 RX ORDER — PREDNISONE 10 MG/1
10 TABLET ORAL 2 TIMES DAILY
Qty: 10 TABLET | Refills: 0 | Status: SHIPPED | OUTPATIENT
Start: 2021-03-10 | End: 2021-05-10

## 2021-03-10 RX ORDER — AZITHROMYCIN 250 MG/1
TABLET, FILM COATED ORAL
Qty: 6 TABLET | Refills: 1 | Status: SHIPPED | OUTPATIENT
Start: 2021-03-10 | End: 2021-05-10

## 2021-03-10 NOTE — PROGRESS NOTES
Subjective   Boby Robles is a 65 y.o. female.   You have chosen to receive care through a telehealth visit.  Do you consent to use a video/audio connection for your medical care today? Yes    History of Present Illness   Boby Robles 65 y.o. female who presents for evaluation of sore throat, ear pressure. Symptoms include ear pressure, sore throat and sinus pain.  Onset of symptoms was 3 months ago, gradually worsening since that time. Patient denies shortness of breath, wheezing, fever.   Evaluation to date: none Treatment to date:  OTC antihistamines.     Ringing in ears and pain; L>R;  Sinus pressure and congestion  More sore throat soreness this week  No wheeze or SOA  Stopped Flonase ---burns  Benadryl  Second COVID vaccine due Tues  BMI Readings from Last 1 Encounters:   11/03/20 26.42 kg/m²       The following portions of the patient's history were reviewed and updated as appropriate: allergies, current medications, past family history, past medical history, past social history, past surgical history and problem list.    Review of Systems   Constitutional: Negative for fever.   HENT: Positive for ear pain, sore throat, tinnitus and trouble swallowing. Negative for congestion and nosebleeds.    Eyes: Negative for visual disturbance.   Respiratory: Negative for cough, choking, shortness of breath, wheezing and stridor.    Cardiovascular: Negative for chest pain.   Gastrointestinal: Negative for blood in stool.   Endocrine: Negative for polydipsia.   Genitourinary: Negative for genital sores and hematuria.   Musculoskeletal: Positive for arthralgias and myalgias.   Skin: Negative for color change and rash.   Allergic/Immunologic: Negative for immunocompromised state.   Neurological: Negative for seizures, facial asymmetry and speech difficulty.   Hematological: Negative for adenopathy.   Psychiatric/Behavioral: Negative for behavioral problems, self-injury and suicidal ideas.       Objective   Physical  Exam  Neurological:      Mental Status: She is alert and oriented to person, place, and time.   Psychiatric:         Mood and Affect: Mood normal.         Behavior: Behavior normal.         Thought Content: Thought content normal.         Judgment: Judgment normal.         Assessment/Plan   Diagnoses and all orders for this visit:    1. Acute sinusitis, recurrence not specified, unspecified location (Primary)    2. Tinnitus of both ears    Other orders  -     azithromycin (ZITHROMAX) 250 MG tablet; Take 2 tablets the first day, then 1 tablet daily for 4 days for infection  Dispense: 6 tablet; Refill: 1  -     Discontinue: predniSONE (DELTASONE) 10 MG tablet; Take 1 tablet by mouth 3 (Three) Times a Day With Meals.  Dispense: 15 tablet; Refill: 0  -     predniSONE (DELTASONE) 10 MG tablet; Take 1 tablet by mouth 2 (Two) Times a Day.  Dispense: 10 tablet; Refill: 0    Pred for inflammation  Zpak for sinus infx  If not resolving--refer ENT--let me know  Time 10 min phone

## 2021-03-10 NOTE — PATIENT INSTRUCTIONS
Sinusitis, Adult  Sinusitis is inflammation of your sinuses. Sinuses are hollow spaces in the bones around your face. Your sinuses are located:  · Around your eyes.  · In the middle of your forehead.  · Behind your nose.  · In your cheekbones.  Mucus normally drains out of your sinuses. When your nasal tissues become inflamed or swollen, mucus can become trapped or blocked. This allows bacteria, viruses, and fungi to grow, which leads to infection. Most infections of the sinuses are caused by a virus.  Sinusitis can develop quickly. It can last for up to 4 weeks (acute) or for more than 12 weeks (chronic). Sinusitis often develops after a cold.  What are the causes?  This condition is caused by anything that creates swelling in the sinuses or stops mucus from draining. This includes:  · Allergies.  · Asthma.  · Infection from bacteria or viruses.  · Deformities or blockages in your nose or sinuses.  · Abnormal growths in the nose (nasal polyps).  · Pollutants, such as chemicals or irritants in the air.  · Infection from fungi (rare).  What increases the risk?  You are more likely to develop this condition if you:  · Have a weak body defense system (immune system).  · Do a lot of swimming or diving.  · Overuse nasal sprays.  · Smoke.  What are the signs or symptoms?  The main symptoms of this condition are pain and a feeling of pressure around the affected sinuses. Other symptoms include:  · Stuffy nose or congestion.  · Thick drainage from your nose.  · Swelling and warmth over the affected sinuses.  · Headache.  · Upper toothache.  · A cough that may get worse at night.  · Extra mucus that collects in the throat or the back of the nose (postnasal drip).  · Decreased sense of smell and taste.  · Fatigue.  · A fever.  · Sore throat.  · Bad breath.  How is this diagnosed?  This condition is diagnosed based on:  · Your symptoms.  · Your medical history.  · A physical exam.  · Tests to find out if your condition is  acute or chronic. This may include:  ? Checking your nose for nasal polyps.  ? Viewing your sinuses using a device that has a light (endoscope).  ? Testing for allergies or bacteria.  ? Imaging tests, such as an MRI or CT scan.  In rare cases, a bone biopsy may be done to rule out more serious types of fungal sinus disease.  How is this treated?  Treatment for sinusitis depends on the cause and whether your condition is chronic or acute.  · If caused by a virus, your symptoms should go away on their own within 10 days. You may be given medicines to relieve symptoms. They include:  ? Medicines that shrink swollen nasal passages (topical intranasal decongestants).  ? Medicines that treat allergies (antihistamines).  ? A spray that eases inflammation of the nostrils (topical intranasal corticosteroids).  ? Rinses that help get rid of thick mucus in your nose (nasal saline washes).  · If caused by bacteria, your health care provider may recommend waiting to see if your symptoms improve. Most bacterial infections will get better without antibiotic medicine. You may be given antibiotics if you have:  ? A severe infection.  ? A weak immune system.  · If caused by narrow nasal passages or nasal polyps, you may need to have surgery.  Follow these instructions at home:  Medicines  · Take, use, or apply over-the-counter and prescription medicines only as told by your health care provider. These may include nasal sprays.  · If you were prescribed an antibiotic medicine, take it as told by your health care provider. Do not stop taking the antibiotic even if you start to feel better.  Hydrate and humidify    · Drink enough fluid to keep your urine pale yellow. Staying hydrated will help to thin your mucus.  · Use a cool mist humidifier to keep the humidity level in your home above 50%.  · Inhale steam for 10-15 minutes, 3-4 times a day, or as told by your health care provider. You can do this in the bathroom while a hot shower is  running.  · Limit your exposure to cool or dry air.  Rest  · Rest as much as possible.  · Sleep with your head raised (elevated).  · Make sure you get enough sleep each night.  General instructions    · Apply a warm, moist washcloth to your face 3-4 times a day or as told by your health care provider. This will help with discomfort.  · Wash your hands often with soap and water to reduce your exposure to germs. If soap and water are not available, use hand .  · Do not smoke. Avoid being around people who are smoking (secondhand smoke).  · Keep all follow-up visits as told by your health care provider. This is important.  Contact a health care provider if:  · You have a fever.  · Your symptoms get worse.  · Your symptoms do not improve within 10 days.  Get help right away if:  · You have a severe headache.  · You have persistent vomiting.  · You have severe pain or swelling around your face or eyes.  · You have vision problems.  · You develop confusion.  · Your neck is stiff.  · You have trouble breathing.  Summary  · Sinusitis is soreness and inflammation of your sinuses. Sinuses are hollow spaces in the bones around your face.  · This condition is caused by nasal tissues that become inflamed or swollen. The swelling traps or blocks the flow of mucus. This allows bacteria, viruses, and fungi to grow, which leads to infection.  · If you were prescribed an antibiotic medicine, take it as told by your health care provider. Do not stop taking the antibiotic even if you start to feel better.  · Keep all follow-up visits as told by your health care provider. This is important.  This information is not intended to replace advice given to you by your health care provider. Make sure you discuss any questions you have with your health care provider.  Document Revised: 05/20/2019 Document Reviewed: 05/20/2019  [employer] Patient Education © 2020 [employer]

## 2021-05-07 DIAGNOSIS — I10 ESSENTIAL HYPERTENSION: ICD-10-CM

## 2021-05-07 RX ORDER — TELMISARTAN AND AMLODIPINE 5; 80 MG/1; MG/1
1 TABLET ORAL DAILY
Qty: 90 TABLET | Refills: 0 | Status: SHIPPED | OUTPATIENT
Start: 2021-05-07 | End: 2021-05-10 | Stop reason: SDUPTHER

## 2021-05-10 ENCOUNTER — OFFICE VISIT (OUTPATIENT)
Dept: FAMILY MEDICINE CLINIC | Facility: CLINIC | Age: 66
End: 2021-05-10

## 2021-05-10 VITALS
RESPIRATION RATE: 16 BRPM | SYSTOLIC BLOOD PRESSURE: 136 MMHG | WEIGHT: 154 LBS | HEIGHT: 64 IN | TEMPERATURE: 97.5 F | BODY MASS INDEX: 26.29 KG/M2 | HEART RATE: 72 BPM | DIASTOLIC BLOOD PRESSURE: 80 MMHG | OXYGEN SATURATION: 97 %

## 2021-05-10 DIAGNOSIS — I10 ESSENTIAL HYPERTENSION: Primary | ICD-10-CM

## 2021-05-10 DIAGNOSIS — Z78.0 POST-MENOPAUSAL: ICD-10-CM

## 2021-05-10 DIAGNOSIS — M79.7 FIBROMYALGIA: ICD-10-CM

## 2021-05-10 DIAGNOSIS — E55.9 VITAMIN D DEFICIENCY: ICD-10-CM

## 2021-05-10 DIAGNOSIS — H61.22 CERUMINOSIS, LEFT: ICD-10-CM

## 2021-05-10 DIAGNOSIS — E78.2 HYPERLIPIDEMIA, MIXED: ICD-10-CM

## 2021-05-10 PROCEDURE — 99213 OFFICE O/P EST LOW 20 MIN: CPT | Performed by: PHYSICIAN ASSISTANT

## 2021-05-10 RX ORDER — TELMISARTAN AND AMLODIPINE 5; 80 MG/1; MG/1
1 TABLET ORAL DAILY
Qty: 90 TABLET | Refills: 1 | Status: SHIPPED | OUTPATIENT
Start: 2021-05-10 | End: 2022-02-11

## 2021-05-10 RX ORDER — MELOXICAM 15 MG/1
TABLET ORAL
Qty: 90 TABLET | Refills: 1 | Status: SHIPPED | OUTPATIENT
Start: 2021-05-10 | End: 2021-11-02

## 2021-05-10 NOTE — PROGRESS NOTES
"Subjective   Boby Robles is a 65 y.o. female.     History of Present Illness    Since the last visit, she has overall felt fairly well.  She has Essential Hypertension and well controlled on current medication, Hyperlipidemia and working on this with diet and exercise and Vitamin D deficiency and labs are at goal >30 ng/mL.  she has been compliant with current medications have reviewed them.  The patient denies medication side effects.  Will refill medications. /80   Pulse 72   Temp 97.5 °F (36.4 °C)   Resp 16   Ht 162.6 cm (64.02\")   Wt 69.9 kg (154 lb)   LMP  (LMP Unknown)   SpO2 97%   BMI 26.42 kg/m²     Results for orders placed or performed in visit on 12/01/20   Comprehensive metabolic panel    Specimen: Blood   Result Value Ref Range    Glucose 94 65 - 99 mg/dL    BUN 13 8 - 23 mg/dL    Creatinine 1.09 (H) 0.57 - 1.00 mg/dL    eGFR Non African Am 50 (L) >60 mL/min/1.73    eGFR African Am 61 >60 mL/min/1.73    BUN/Creatinine Ratio 11.9 7.0 - 25.0    Sodium 134 (L) 136 - 145 mmol/L    Potassium 4.1 3.5 - 5.2 mmol/L    Chloride 102 98 - 107 mmol/L    Total CO2 23.9 22.0 - 29.0 mmol/L    Calcium 9.2 8.6 - 10.5 mg/dL    Total Protein 6.9 6.0 - 8.5 g/dL    Albumin 3.90 3.50 - 5.20 g/dL    Globulin 3.0 gm/dL    A/G Ratio 1.3 g/dL    Total Bilirubin 0.3 0.0 - 1.2 mg/dL    Alkaline Phosphatase 63 39 - 117 U/L    AST (SGOT) 22 1 - 32 U/L    ALT (SGPT) 18 1 - 33 U/L   Lipid panel    Specimen: Blood   Result Value Ref Range    Total Cholesterol 206 (H) 0 - 200 mg/dL    Triglycerides 91 0 - 150 mg/dL    HDL Cholesterol 89 (H) 40 - 60 mg/dL    VLDL Cholesterol Alcides 16 5 - 40 mg/dL    LDL Chol Calc (NIH) 101 (H) 0 - 100 mg/dL   TSH    Specimen: Blood   Result Value Ref Range    TSH 0.955 0.270 - 4.200 uIU/mL   T3, Free    Specimen: Blood   Result Value Ref Range    T3, Free 3.4 2.0 - 4.4 pg/mL   T4, Free    Specimen: Blood   Result Value Ref Range    Free T4 1.34 0.93 - 1.70 ng/dL   Vitamin D 25 Hydroxy    " Specimen: Blood   Result Value Ref Range    25 Hydroxy, Vitamin D 44.1 30.0 - 100.0 ng/ml   Vitamin B12    Specimen: Blood   Result Value Ref Range    Vitamin B-12 843 211 - 946 pg/mL   Folate    Specimen: Blood   Result Value Ref Range    Folate 19.50 4.78 - 24.20 ng/mL   CBC and Differential    Specimen: Blood   Result Value Ref Range    WBC 5.25 3.40 - 10.80 10*3/mm3    RBC 4.15 3.77 - 5.28 10*6/mm3    Hemoglobin 12.4 12.0 - 15.9 g/dL    Hematocrit 37.8 34.0 - 46.6 %    MCV 91.1 79.0 - 97.0 fL    MCH 29.9 26.6 - 33.0 pg    MCHC 32.8 31.5 - 35.7 g/dL    RDW 11.4 (L) 12.3 - 15.4 %    Platelets 262 140 - 450 10*3/mm3    Neutrophil Rel % 53.7 42.7 - 76.0 %    Lymphocyte Rel % 36.0 19.6 - 45.3 %    Monocyte Rel % 6.5 5.0 - 12.0 %    Eosinophil Rel % 2.1 0.3 - 6.2 %    Basophil Rel % 1.5 0.0 - 1.5 %    Neutrophils Absolute 2.82 1.70 - 7.00 10*3/mm3    Lymphocytes Absolute 1.89 0.70 - 3.10 10*3/mm3    Monocytes Absolute 0.34 0.10 - 0.90 10*3/mm3    Eosinophils Absolute 0.11 0.00 - 0.40 10*3/mm3    Basophils Absolute 0.08 0.00 - 0.20 10*3/mm3    Immature Granulocyte Rel % 0.2 0.0 - 0.5 %    Immature Grans Absolute 0.01 0.00 - 0.05 10*3/mm3    nRBC 0.0 0.0 - 0.2 /100 WBC     Walks for exercise    She does best on PRN Mobic 7.5mg; up to 15mg  EGFR was 61 on Dec labs  Long term use of NSAIDs can lead to heart disease and strokes, as well as GI bleeding/ulcers, and cause kidney disease. Advise labs to monitor renal functions to be done at least every 6 months.    Last chol score 9.36%---declines statin  Watching Na also  Failed Savella and Cymbalta  The following portions of the patient's history were reviewed and updated as appropriate: allergies, current medications, past family history, past medical history, past social history, past surgical history and problem list.    Review of Systems   Constitutional: Negative for fever.   HENT: Positive for tinnitus. Negative for nosebleeds and trouble swallowing.    Eyes: Negative  for visual disturbance.   Respiratory: Negative for choking and stridor.    Cardiovascular: Negative for chest pain.   Gastrointestinal: Negative for blood in stool.   Endocrine: Negative for polydipsia.   Genitourinary: Negative for genital sores and hematuria.   Musculoskeletal: Positive for arthralgias and myalgias. Negative for joint swelling.   Skin: Negative for color change and rash.   Allergic/Immunologic: Positive for environmental allergies. Negative for immunocompromised state.   Neurological: Positive for light-headedness. Negative for seizures, facial asymmetry and speech difficulty.   Hematological: Negative for adenopathy.   Psychiatric/Behavioral: Negative for behavioral problems, self-injury and suicidal ideas.       Objective   Physical Exam  Vitals and nursing note reviewed.   Constitutional:       General: She is not in acute distress.     Appearance: She is well-developed. She is not ill-appearing or toxic-appearing.   HENT:      Head: Normocephalic.      Right Ear: External ear normal.      Left Ear: External ear normal. There is impacted cerumen.      Nose: Nose normal.      Mouth/Throat:      Pharynx: Oropharynx is clear.   Eyes:      General: No scleral icterus.     Conjunctiva/sclera: Conjunctivae normal.      Pupils: Pupils are equal, round, and reactive to light.   Neck:      Thyroid: No thyromegaly.      Vascular: No carotid bruit.   Cardiovascular:      Rate and Rhythm: Normal rate and regular rhythm.      Pulses: Normal pulses.      Heart sounds: Normal heart sounds. No murmur heard.     Pulmonary:      Effort: Pulmonary effort is normal. No respiratory distress.      Breath sounds: Normal breath sounds. No rales.   Musculoskeletal:         General: No deformity. Normal range of motion.      Cervical back: Normal range of motion and neck supple.   Skin:     General: Skin is warm and dry.      Findings: No rash.   Neurological:      General: No focal deficit present.      Mental Status:  She is alert and oriented to person, place, and time. Mental status is at baseline.   Psychiatric:         Mood and Affect: Mood normal.         Behavior: Behavior normal.         Thought Content: Thought content normal.         Judgment: Judgment normal.         Assessment/Plan   Diagnoses and all orders for this visit:    1. Essential hypertension (Primary)  -     Basic Metabolic Panel    2. Fibromyalgia    3. Vitamin D deficiency    4. Hyperlipidemia, mixed    5. Renal insufficiency  -     Basic Metabolic Panel    6. Ceruminosis, left  -     Ambulatory Referral to ENT (Otolaryngology)    Other orders  -     meloxicam (Mobic) 15 MG tablet; 1/2-1 PO daily PRN pain; stop if GI upset  Dispense: 90 tablet; Refill: 1      Refer ENT--tinnitus  BMP---need in a month or today ---order to watch renal labs----on Mobic PRN  Fibro  Plan, Boby Villaton, was seen today.  she was seen for HTN and continue medication, Hyperlipidemia and will work on this with diet and exercise and Vitamin D deficiency and supplemented.  DEXA due           Answers for HPI/ROS submitted by the patient on 5/3/2021  What is the primary reason for your visit?: High Blood Pressure

## 2021-05-11 LAB
BUN SERPL-MCNC: 12 MG/DL (ref 8–23)
BUN/CREAT SERPL: 11.1 (ref 7–25)
CALCIUM SERPL-MCNC: 10.1 MG/DL (ref 8.6–10.5)
CHLORIDE SERPL-SCNC: 105 MMOL/L (ref 98–107)
CO2 SERPL-SCNC: 26.2 MMOL/L (ref 22–29)
CREAT SERPL-MCNC: 1.08 MG/DL (ref 0.57–1)
GLUCOSE SERPL-MCNC: 98 MG/DL (ref 65–99)
POTASSIUM SERPL-SCNC: 4.5 MMOL/L (ref 3.5–5.2)
SODIUM SERPL-SCNC: 143 MMOL/L (ref 136–145)

## 2021-05-11 NOTE — PROGRESS NOTES
Renal functions the same and EGFR is 62.  Want this number above 60 and goal met.  Sodium level perfect this time and labs are fine

## 2021-08-03 RX ORDER — CONJUGATED ESTROGENS 0.62 MG/1
TABLET, FILM COATED ORAL
Qty: 90 TABLET | Refills: 3 | Status: SHIPPED | OUTPATIENT
Start: 2021-08-03 | End: 2021-08-05 | Stop reason: SDUPTHER

## 2021-08-05 ENCOUNTER — TELEPHONE (OUTPATIENT)
Dept: FAMILY MEDICINE CLINIC | Facility: CLINIC | Age: 66
End: 2021-08-05

## 2021-08-05 NOTE — TELEPHONE ENCOUNTER
Caller: Boby Robles AYUSH    Relationship: Self    Best call back number: [phone]    Medication needed:   Requested Prescriptions     Pending Prescriptions Disp Refills   • estrogens, conjugated, (Premarin) 0.625 MG tablet 90 tablet 3     Sig: Take 1 tablet by mouth Daily. Take daily for 21 days then do not take for 7 days.       When do you need the refill by: ASAP    What additional details did the patient provide when requesting the medication: PATIENT REQUESTING REFILL ON THIS BY THE September 1. PATIENT ALSO REQUESTING PRIOR AUTHORIZATION ON THIS MEDICATION IT IS EXPIRING ON 08/21. PATIENT ASKING IF DR MARRERO CAN CALL THE PRIOR AUTHORIZATION LINE -[phone]. PLEASE ADVISE, THANK YOU!      Does the patient have less than a 3 day supply:  [] Yes  [x] No    What is the patient's preferred pharmacy: [facility]/PHARMACY #[id number] - [facility], [place] - [address] AT [facility] [phone] [facility] [phone] FX

## 2021-08-09 ENCOUNTER — PRIOR AUTHORIZATION (OUTPATIENT)
Dept: FAMILY MEDICINE CLINIC | Facility: CLINIC | Age: 66
End: 2021-08-09

## 2021-08-19 ENCOUNTER — TELEPHONE (OUTPATIENT)
Dept: FAMILY MEDICINE CLINIC | Facility: CLINIC | Age: 66
End: 2021-08-19

## 2021-08-19 NOTE — TELEPHONE ENCOUNTER
----- Message from Boby Robles sent at 8/19/2021  6:07 PM EDT -----  Regarding: Prescription Question  Contact: [phone]  Please advise as to why my Premarin 0.625mg was written to be taken daily 1 tablet by mouth and then take daily for 21 days then do not take for 7 days. I noticed this immediately  after having taken this med for 46 yrs and contacted the pharmacy and they indicated that [facility] approved and sent it over that way.  Please advise . I have indeed p/u and will be assuming the routine of the past unless otherwise informed. Should someone have to adminster this medicine it would be incorrect prescription info that I was not aware of.    Thank You

## 2021-09-17 ENCOUNTER — HOSPITAL ENCOUNTER (OUTPATIENT)
Dept: BONE DENSITY | Facility: HOSPITAL | Age: 66
Discharge: HOME | End: 2021-09-17
Admitting: PHYSICIAN ASSISTANT

## 2021-09-17 DIAGNOSIS — Z78.0 POST-MENOPAUSAL: ICD-10-CM

## 2021-09-17 PROCEDURE — 77080 DXA BONE DENSITY AXIAL: CPT

## 2021-10-08 ENCOUNTER — FLU SHOT (OUTPATIENT)
Dept: FAMILY MEDICINE CLINIC | Facility: CLINIC | Age: 66
End: 2021-10-08

## 2021-10-08 DIAGNOSIS — Z23 NEED FOR INFLUENZA VACCINATION: Primary | ICD-10-CM

## 2021-10-08 PROCEDURE — 90662 IIV NO PRSV INCREASED AG IM: CPT | Performed by: PHYSICIAN ASSISTANT

## 2021-10-08 PROCEDURE — G0008 ADMIN INFLUENZA VIRUS VAC: HCPCS | Performed by: PHYSICIAN ASSISTANT

## 2021-11-02 RX ORDER — MELOXICAM 15 MG/1
TABLET ORAL
Qty: 90 TABLET | Refills: 0 | Status: SHIPPED | OUTPATIENT
Start: 2021-11-02 | End: 2022-02-23 | Stop reason: SDUPTHER

## 2022-02-11 DIAGNOSIS — I10 ESSENTIAL HYPERTENSION: ICD-10-CM

## 2022-02-11 RX ORDER — TELMISARTAN AND AMLODIPINE 5; 80 MG/1; MG/1
1 TABLET ORAL DAILY
Qty: 30 TABLET | Refills: 0 | Status: SHIPPED | OUTPATIENT
Start: 2022-02-11 | End: 2022-02-23 | Stop reason: SDUPTHER

## 2022-02-11 RX ORDER — TELMISARTAN AND AMLODIPINE 5; 80 MG/1; MG/1
1 TABLET ORAL DAILY
Qty: 90 TABLET | OUTPATIENT
Start: 2022-02-11

## 2022-02-16 ENCOUNTER — TELEPHONE (OUTPATIENT)
Dept: FAMILY MEDICINE CLINIC | Facility: CLINIC | Age: 67
End: 2022-02-16

## 2022-02-16 NOTE — TELEPHONE ENCOUNTER
Please inform patient that Medicare and insurance guidelines require that I see her every 6 months for management of hypertension.  She needs to see me for an appointment.

## 2022-02-16 NOTE — TELEPHONE ENCOUNTER
Caller: Boby Robles    Relationship to patient: Self    Best call back number: [phone]     Patient is needing: PATIENT STATED SHE NEEDS A 90 DAY SUPPLY OF THE    TELMISARTAN-AMLODIPINE . SHE ONLY HAS A 30 DAY SUPPLY.  HUB STATED PHYSICIAN NEEDS AN APPOINTMENT AND PATIENT DECLINED AND STATED SHE DOESN'T NEED TO BE SEEN UNTIL MAY . IF PATIENT NEEDS AN APPOINTMENT PLEASE CALL HER .       SEND MEDICATION TO : [facility]/pharmacy #[id number] - [place], [place] - [address] AT [facility] - [phone]  - [phone]   [phone]

## 2022-02-23 ENCOUNTER — OFFICE VISIT (OUTPATIENT)
Dept: FAMILY MEDICINE CLINIC | Facility: CLINIC | Age: 67
End: 2022-02-23

## 2022-02-23 ENCOUNTER — TRANSCRIBE ORDERS (OUTPATIENT)
Dept: ADMINISTRATIVE | Facility: HOSPITAL | Age: 67
End: 2022-02-23

## 2022-02-23 VITALS
HEART RATE: 63 BPM | WEIGHT: 154 LBS | TEMPERATURE: 96.9 F | SYSTOLIC BLOOD PRESSURE: 126 MMHG | BODY MASS INDEX: 26.29 KG/M2 | HEIGHT: 64 IN | RESPIRATION RATE: 16 BRPM | OXYGEN SATURATION: 100 % | DIASTOLIC BLOOD PRESSURE: 68 MMHG

## 2022-02-23 DIAGNOSIS — I10 ESSENTIAL HYPERTENSION: ICD-10-CM

## 2022-02-23 DIAGNOSIS — E55.9 VITAMIN D DEFICIENCY: ICD-10-CM

## 2022-02-23 DIAGNOSIS — K11.20 PERIODIC SIALADENOSIS: ICD-10-CM

## 2022-02-23 DIAGNOSIS — Z13.9 SCREENING DUE: Primary | ICD-10-CM

## 2022-02-23 DIAGNOSIS — M79.7 FIBROMYALGIA: ICD-10-CM

## 2022-02-23 DIAGNOSIS — I10 PRIMARY HYPERTENSION: Primary | ICD-10-CM

## 2022-02-23 DIAGNOSIS — E78.2 HYPERLIPIDEMIA, MIXED: ICD-10-CM

## 2022-02-23 DIAGNOSIS — Z82.49 FAMILY HISTORY OF CORONARY ARTERY DISEASE IN MOTHER: ICD-10-CM

## 2022-02-23 DIAGNOSIS — Z12.31 ENCOUNTER FOR SCREENING MAMMOGRAM FOR BREAST CANCER: ICD-10-CM

## 2022-02-23 DIAGNOSIS — J34.89 SORE IN NOSE: ICD-10-CM

## 2022-02-23 PROCEDURE — 99214 OFFICE O/P EST MOD 30 MIN: CPT | Performed by: PHYSICIAN ASSISTANT

## 2022-02-23 RX ORDER — MELOXICAM 15 MG/1
TABLET ORAL
Qty: 90 TABLET | Refills: 1 | Status: SHIPPED | OUTPATIENT
Start: 2022-02-23 | End: 2022-08-23 | Stop reason: SDUPTHER

## 2022-02-23 RX ORDER — AMOXICILLIN AND CLAVULANATE POTASSIUM 875; 125 MG/1; MG/1
1 TABLET, FILM COATED ORAL EVERY 12 HOURS SCHEDULED
Qty: 20 TABLET | Refills: 1 | Status: SHIPPED | OUTPATIENT
Start: 2022-02-23 | End: 2022-03-15 | Stop reason: ALTCHOICE

## 2022-02-23 RX ORDER — TELMISARTAN AND AMLODIPINE 5; 80 MG/1; MG/1
1 TABLET ORAL DAILY
Qty: 90 TABLET | Refills: 1 | Status: SHIPPED | OUTPATIENT
Start: 2022-02-23 | End: 2022-08-22

## 2022-02-23 RX ORDER — BACLOFEN 10 MG/1
10 TABLET ORAL 3 TIMES DAILY
Qty: 30 TABLET | Refills: 11 | Status: SHIPPED | OUTPATIENT
Start: 2022-02-23 | End: 2022-03-15 | Stop reason: ALTCHOICE

## 2022-02-23 NOTE — PROGRESS NOTES
Subjective   Boby Robles is a 66 y.o. female.     History of Present Illness     Episodes sores in nose  Fibromyalgia and OA  Does take Mobic and helps; I want her to try Baclofen for the episodes of muscle pain and spasms  Long term use of NSAIDs can lead to heart disease and strokes, as well as GI bleeding/ulcers, and cause kidney disease. Advise labs to monitor renal functions to be done at least every 6 months.     Last chol score 9.36%---declines statin  Watching Na also  Failed Savella and Cymbalta      EKG 10-3-12  RESULTS:   ECG tracing today shows normal sinus rhythm  Normal stress echo 2010  Parents both CAD and CVA---want her to see cardio---last time 2010  Also want carotid doppler screen; I still suggest statin  Long term use of NSAIDs can lead to heart disease and strokes, as well as GI bleeding/ulcers, and cause kidney disease. Advise labs to monitor renal functions to be done at least every 6 months.    The following portions of the patient's history were reviewed and updated as appropriate: allergies, current medications, past family history, past medical history, past social history, past surgical history and problem list.    Review of Systems   Constitutional: Negative for activity change, appetite change and unexpected weight change.   HENT: Negative for nosebleeds and trouble swallowing.    Eyes: Negative for pain and visual disturbance.   Respiratory: Negative for chest tightness, shortness of breath and wheezing.    Cardiovascular: Negative for chest pain and palpitations.   Gastrointestinal: Negative for abdominal pain and blood in stool.   Endocrine: Negative.    Genitourinary: Negative for difficulty urinating and hematuria.   Musculoskeletal: Positive for arthralgias, back pain and joint swelling.   Skin: Positive for wound. Negative for color change and rash.   Allergic/Immunologic: Negative.    Neurological: Negative for syncope and speech difficulty.   Hematological: Negative for  adenopathy.   Psychiatric/Behavioral: Negative for agitation and confusion.   All other systems reviewed and are negative.      Objective   Physical Exam  Vitals and nursing note reviewed.   Constitutional:       General: She is not in acute distress.     Appearance: Normal appearance. She is well-developed. She is not ill-appearing or toxic-appearing.   HENT:      Head: Normocephalic.      Right Ear: External ear normal.      Left Ear: External ear normal.      Nose: Nose normal.      Mouth/Throat:      Pharynx: Oropharynx is clear.   Eyes:      General: No scleral icterus.     Conjunctiva/sclera: Conjunctivae normal.      Pupils: Pupils are equal, round, and reactive to light.   Neck:      Thyroid: No thyromegaly.      Vascular: No carotid bruit.   Cardiovascular:      Rate and Rhythm: Normal rate and regular rhythm.      Heart sounds: Normal heart sounds. No murmur heard.      Pulmonary:      Effort: Pulmonary effort is normal. No respiratory distress.      Breath sounds: Normal breath sounds. No rales.   Musculoskeletal:         General: No deformity. Normal range of motion.      Cervical back: Normal range of motion and neck supple.      Right lower leg: No edema.      Left lower leg: No edema.   Skin:     General: Skin is warm and dry.      Coloration: Skin is not jaundiced.      Findings: No rash.   Neurological:      General: No focal deficit present.      Mental Status: She is alert and oriented to person, place, and time. Mental status is at baseline.   Psychiatric:         Mood and Affect: Mood normal.         Behavior: Behavior normal.         Thought Content: Thought content normal.         Judgment: Judgment normal.         Assessment/Plan   Diagnoses and all orders for this visit:    1. Primary hypertension (Primary)  -     Ambulatory Referral to Cardiology    2. Fibromyalgia    3. Hyperlipidemia, mixed    4. Sore in nose    5. Family history of coronary artery disease in mother  -     Ambulatory  Referral to Cardiology    6. Encounter for screening mammogram for breast cancer  -     Mammo Screening Digital Tomosynthesis Bilateral With CAD; Future    Other orders  -     baclofen (LIORESAL) 10 MG tablet; Take 1 tablet by mouth 3 (Three) Times a Day. PRN spasms  Dispense: 30 tablet; Refill: 11  -     mupirocin (BACTROBAN) 2 % ointment; Apply 1 application topically to the appropriate area as directed 3 (Three) Times a Day. PRN wounds  Dispense: 30 g; Refill: 5        She is having left salivary stones at times---suck lemon candy;  watch  Try Baclofen for muscle pain and spasm episodes  Parents both CAD and CVA---want her to see cardio---last time 2010  Also want carotid doppler screen; I still suggest statin  Wants to stay on Premarin; risk breast cancer concern  Bacroban PRN nasal sores  Stop NSAID if GI upset or any signs tarry or blood in stools     Answers for HPI/ROS submitted by the patient on 2/18/2022  What is the primary reason for your visit?: High Blood Pressure

## 2022-02-24 LAB
25(OH)D3+25(OH)D2 SERPL-MCNC: 51.1 NG/ML (ref 30–100)
ALBUMIN SERPL-MCNC: 4.3 G/DL (ref 3.8–4.8)
ALBUMIN/GLOB SERPL: 1.5 {RATIO} (ref 1.2–2.2)
ALP SERPL-CCNC: 73 IU/L (ref 44–121)
ALT SERPL-CCNC: 17 IU/L (ref 0–32)
APPEARANCE UR: CLEAR
AST SERPL-CCNC: 24 IU/L (ref 0–40)
BACTERIA #/AREA URNS HPF: NORMAL /[HPF]
BASOPHILS # BLD AUTO: 0.1 X10E3/UL (ref 0–0.2)
BASOPHILS NFR BLD AUTO: 1 %
BILIRUB SERPL-MCNC: 0.3 MG/DL (ref 0–1.2)
BILIRUB UR QL STRIP: NEGATIVE
BUN SERPL-MCNC: 14 MG/DL (ref 8–27)
BUN/CREAT SERPL: 14 (ref 12–28)
CALCIUM SERPL-MCNC: 9.8 MG/DL (ref 8.7–10.3)
CASTS URNS QL MICRO: NORMAL /LPF
CHLORIDE SERPL-SCNC: 102 MMOL/L (ref 96–106)
CHOLEST SERPL-MCNC: 214 MG/DL (ref 100–199)
CO2 SERPL-SCNC: 24 MMOL/L (ref 20–29)
COLOR UR: YELLOW
CREAT SERPL-MCNC: 1.01 MG/DL (ref 0.57–1)
EOSINOPHIL # BLD AUTO: 0.1 X10E3/UL (ref 0–0.4)
EOSINOPHIL NFR BLD AUTO: 1 %
EPI CELLS #/AREA URNS HPF: NORMAL /HPF (ref 0–10)
ERYTHROCYTE [DISTWIDTH] IN BLOOD BY AUTOMATED COUNT: 11.3 % (ref 11.7–15.4)
FOLATE SERPL-MCNC: >20 NG/ML
GLOBULIN SER CALC-MCNC: 2.8 G/DL (ref 1.5–4.5)
GLUCOSE SERPL-MCNC: 100 MG/DL (ref 65–99)
GLUCOSE UR QL STRIP: NEGATIVE
HCT VFR BLD AUTO: 39.2 % (ref 34–46.6)
HDLC SERPL-MCNC: 89 MG/DL
HGB BLD-MCNC: 12.7 G/DL (ref 11.1–15.9)
HGB UR QL STRIP: NEGATIVE
IMM GRANULOCYTES # BLD AUTO: 0 X10E3/UL (ref 0–0.1)
IMM GRANULOCYTES NFR BLD AUTO: 0 %
KETONES UR QL STRIP: NEGATIVE
LDLC SERPL CALC-MCNC: 107 MG/DL (ref 0–99)
LEUKOCYTE ESTERASE UR QL STRIP: NEGATIVE
LYMPHOCYTES # BLD AUTO: 1.6 X10E3/UL (ref 0.7–3.1)
LYMPHOCYTES NFR BLD AUTO: 26 %
MCH RBC QN AUTO: 29.5 PG (ref 26.6–33)
MCHC RBC AUTO-ENTMCNC: 32.4 G/DL (ref 31.5–35.7)
MCV RBC AUTO: 91 FL (ref 79–97)
MICRO URNS: NORMAL
MICRO URNS: NORMAL
MONOCYTES # BLD AUTO: 0.4 X10E3/UL (ref 0.1–0.9)
MONOCYTES NFR BLD AUTO: 6 %
NEUTROPHILS # BLD AUTO: 4.1 X10E3/UL (ref 1.4–7)
NEUTROPHILS NFR BLD AUTO: 66 %
NITRITE UR QL STRIP: NEGATIVE
PH UR STRIP: 6.5 [PH] (ref 5–7.5)
PLATELET # BLD AUTO: 266 X10E3/UL (ref 150–450)
POTASSIUM SERPL-SCNC: 4.5 MMOL/L (ref 3.5–5.2)
PROT SERPL-MCNC: 7.1 G/DL (ref 6–8.5)
PROT UR QL STRIP: NEGATIVE
RBC # BLD AUTO: 4.3 X10E6/UL (ref 3.77–5.28)
RBC #/AREA URNS HPF: NORMAL /HPF (ref 0–2)
SODIUM SERPL-SCNC: 141 MMOL/L (ref 134–144)
SP GR UR STRIP: 1.01 (ref 1–1.03)
T3FREE SERPL-MCNC: 3.3 PG/ML (ref 2–4.4)
T4 FREE SERPL-MCNC: 1.26 NG/DL (ref 0.82–1.77)
TRIGL SERPL-MCNC: 105 MG/DL (ref 0–149)
TSH SERPL DL<=0.005 MIU/L-ACNC: 0.64 UIU/ML (ref 0.45–4.5)
UROBILINOGEN UR STRIP-MCNC: 0.2 MG/DL (ref 0.2–1)
VIT B12 SERPL-MCNC: 786 PG/ML (ref 232–1245)
VLDLC SERPL CALC-MCNC: 18 MG/DL (ref 5–40)
WBC # BLD AUTO: 6.2 X10E3/UL (ref 3.4–10.8)
WBC #/AREA URNS HPF: NORMAL /HPF (ref 0–5)

## 2022-02-25 ENCOUNTER — PRIOR AUTHORIZATION (OUTPATIENT)
Dept: FAMILY MEDICINE CLINIC | Facility: CLINIC | Age: 67
End: 2022-02-25

## 2022-03-15 ENCOUNTER — OFFICE VISIT (OUTPATIENT)
Dept: CARDIOLOGY | Facility: CLINIC | Age: 67
End: 2022-03-15

## 2022-03-15 VITALS
HEART RATE: 71 BPM | WEIGHT: 152.8 LBS | SYSTOLIC BLOOD PRESSURE: 150 MMHG | HEIGHT: 64 IN | DIASTOLIC BLOOD PRESSURE: 78 MMHG | OXYGEN SATURATION: 100 % | BODY MASS INDEX: 26.09 KG/M2

## 2022-03-15 DIAGNOSIS — Z82.49 FAMILY HISTORY OF EARLY CAD: Primary | ICD-10-CM

## 2022-03-15 DIAGNOSIS — R94.31 ABNORMAL ELECTROCARDIOGRAM (ECG) (EKG): ICD-10-CM

## 2022-03-15 PROCEDURE — 93000 ELECTROCARDIOGRAM COMPLETE: CPT | Performed by: INTERNAL MEDICINE

## 2022-03-15 PROCEDURE — 99204 OFFICE O/P NEW MOD 45 MIN: CPT | Performed by: INTERNAL MEDICINE

## 2022-03-17 NOTE — PROGRESS NOTES
"      CARDIOLOGY    Jimmie Devries MD    ENCOUNTER DATE:  03/15/2022    Boby Robles / 66 y.o. / female        CHIEF COMPLAINT / REASON FOR OFFICE VISIT     Hypertension and Family history heart disease      HISTORY OF PRESENT ILLNESS       HPI  Boby Robles is a 66 y.o. female who presents today for consultation.  Patient has a history of hypertension as well as a very strong family history of coronary artery disease.  Patient currently denies any types of cardiac symptoms.  Shows intact palpitations shortness of breath edema lightheadedness or chest pain.  Patient has had some leg and muscle problems/pain.  Patient was seen today for a stratification.      The following portions of the patient's history were reviewed and updated as appropriate: allergies, current medications, past family history, past medical history, past social history, past surgical history and problem list.      VITAL SIGNS     Visit Vitals  /78 (BP Location: Left arm)   Pulse 71   Ht 162.6 cm (64\")   Wt 69.3 kg (152 lb 12.8 oz)   LMP  (LMP Unknown)   SpO2 100%   BMI 26.23 kg/m²         Wt Readings from Last 3 Encounters:   03/15/22 69.3 kg (152 lb 12.8 oz)   02/23/22 69.9 kg (154 lb)   05/10/21 69.9 kg (154 lb)     Body mass index is 26.23 kg/m².      REVIEW OF SYSTEMS   Review of Systems   All other systems reviewed and are negative.          PHYSICAL EXAMINATION     Vitals reviewed.   Constitutional:       Appearance: Healthy appearance.   Pulmonary:      Effort: Pulmonary effort is normal.   Cardiovascular:      Normal rate. Regular rhythm. Normal S1. Normal S2.      Murmurs: There is no murmur.      No gallop. No click. No rub.   Pulses:     Intact distal pulses.   Edema:     Peripheral edema absent.   Neurological:      Mental Status: Alert.           REVIEWED DATA       ECG 12 Lead    Date/Time: 3/15/2022 12:22 PM  Performed by: Jimmie Devries MD  Authorized by: Jimmie Devries MD   Comparison: compared with " previous ECG from 10/3/2022  Rhythm: sinus rhythm  Other findings: non-specific ST-T wave changes and poor R wave progression    Clinical impression: non-specific ECG            Cardiac Procedures:  1.     Lipid Panel    Lipid Panel 2/23/22   Total Cholesterol 214 (A)   Triglycerides 105   HDL Cholesterol 89   VLDL Cholesterol 18   LDL Cholesterol  107 (A)   (A) Abnormal value                ASSESSMENT & PLAN      Diagnosis Plan   1. Family history of early CAD  Treadmill Stress Test   2. Abnormal electrocardiogram (ECG) (EKG)   Treadmill Stress Test         SUMMARY/DISCUSSION  1. Patient has a strong family history.  We did do an EKG today on her which showed poor R wave progression and some nonspecific ST to abnormalities.  Because of that I did set her up for a plain treadmill to see how far she walked and whether she had any EKG changes with exertion.  2. Hypertension blood pressure is elevated today.  I did want her to follow her blood pressure at home closely and report back see how it is doing.  We will also see what her blood pressure does when she exercises.  3. Follow-up will depend on the stress test.        MEDICATIONS         Discharge Medications          Accurate as of March 15, 2022 11:59 PM. If you have any questions, ask your nurse or doctor.            Continue These Medications      Instructions Start Date   cholecalciferol 25 MCG (1000 UT) tablet  Commonly known as: VITAMIN D3   1,000 Units, Oral, Daily      estrogens (conjugated) 0.625 MG tablet  Commonly known as: Premarin   0.625 mg, Oral, Daily, 1 p.o. daily      meloxicam 15 MG tablet  Commonly known as: MOBIC   TAKE 1/2-1 TABLET BY MOUTH EVERY DAY AS NEEDED FOR PAIN. STOP IF GI UPSET      MULTI ADULT GUMMIES PO   Oral      telmisartan-amLODIPine 80-5 MG per tablet  Commonly known as: TWYNSTA   1 tablet, Oral, Daily, for blood pressure      vitamin E 400 UNIT capsule   400 Units, Oral, Daily                 **Dragon Disclaimer:   Much of  this encounter note is an electronic transcription/translation of spoken language to printed text. The electronic translation of spoken language may permit erroneous, or at times, nonsensical words or phrases to be inadvertently transcribed. Although I have reviewed the note for such errors, some may still exist.

## 2022-03-22 ENCOUNTER — HOSPITAL ENCOUNTER (OUTPATIENT)
Dept: CARDIOLOGY | Facility: HOSPITAL | Age: 67
Discharge: HOME | End: 2022-03-22
Admitting: INTERNAL MEDICINE

## 2022-03-22 DIAGNOSIS — R94.31 ABNORMAL ELECTROCARDIOGRAM (ECG) (EKG): ICD-10-CM

## 2022-03-22 DIAGNOSIS — Z82.49 FAMILY HISTORY OF EARLY CAD: ICD-10-CM

## 2022-03-22 LAB
BH CV STRESS BP STAGE 1: NORMAL
BH CV STRESS BP STAGE 2: NORMAL
BH CV STRESS DURATION MIN STAGE 1: 3
BH CV STRESS DURATION MIN STAGE 2: 3
BH CV STRESS DURATION MIN STAGE 3: 1
BH CV STRESS DURATION SEC STAGE 1: 0
BH CV STRESS DURATION SEC STAGE 2: 0
BH CV STRESS DURATION SEC STAGE 3: 0
BH CV STRESS GRADE STAGE 1: 10
BH CV STRESS GRADE STAGE 2: 12
BH CV STRESS GRADE STAGE 3: 14
BH CV STRESS HR STAGE 1: 109
BH CV STRESS HR STAGE 2: 132
BH CV STRESS HR STAGE 3: 146
BH CV STRESS METS STAGE 1: 5
BH CV STRESS METS STAGE 2: 7.5
BH CV STRESS METS STAGE 3: 10
BH CV STRESS PROTOCOL 1: NORMAL
BH CV STRESS RECOVERY BP: NORMAL MMHG
BH CV STRESS RECOVERY HR: 94 BPM
BH CV STRESS SPEED STAGE 1: 1.7
BH CV STRESS SPEED STAGE 2: 2.5
BH CV STRESS SPEED STAGE 3: 3.4
BH CV STRESS STAGE 1: 1
BH CV STRESS STAGE 2: 2
BH CV STRESS STAGE 3: 3
MAXIMAL PREDICTED HEART RATE: 154 BPM
PERCENT MAX PREDICTED HR: 94.81 %
STRESS BASELINE BP: NORMAL MMHG
STRESS BASELINE HR: 75 BPM
STRESS PERCENT HR: 112 %
STRESS POST ESTIMATED WORKLOAD: 8 METS
STRESS POST EXERCISE DUR MIN: 7 MIN
STRESS POST EXERCISE DUR SEC: 0 SEC
STRESS POST PEAK BP: NORMAL MMHG
STRESS POST PEAK HR: 146 BPM
STRESS TARGET HR: 131 BPM

## 2022-03-22 PROCEDURE — 93017 CV STRESS TEST TRACING ONLY: CPT

## 2022-03-22 PROCEDURE — 93018 CV STRESS TEST I&R ONLY: CPT | Performed by: INTERNAL MEDICINE

## 2022-03-22 PROCEDURE — 93016 CV STRESS TEST SUPVJ ONLY: CPT | Performed by: INTERNAL MEDICINE

## 2022-05-03 ENCOUNTER — TELEPHONE (OUTPATIENT)
Dept: FAMILY MEDICINE CLINIC | Facility: CLINIC | Age: 67
End: 2022-05-03

## 2022-05-03 RX ORDER — FLUCONAZOLE 150 MG/1
150 TABLET ORAL ONCE
Qty: 1 TABLET | Refills: 2 | Status: SHIPPED | OUTPATIENT
Start: 2022-05-03 | End: 2023-02-23 | Stop reason: SDUPTHER

## 2022-05-03 NOTE — TELEPHONE ENCOUNTER
----- Message from Boby Robles sent at 5/2/2022 11:32 PM EDT -----  Regarding: RXRequest  Gina,  I have had a light milky type discharge lately. No itching or odor but a burning sensation. Is there something you could send to [facility] for me please? I have used( Diflucan) I think that's it before and it always took care of it.    Will look forward to your reply in the morning.  Thank You,  Boby

## 2022-07-11 ENCOUNTER — PRIOR AUTHORIZATION (OUTPATIENT)
Dept: FAMILY MEDICINE CLINIC | Facility: CLINIC | Age: 67
End: 2022-07-11

## 2022-07-12 ENCOUNTER — APPOINTMENT (OUTPATIENT)
Dept: CARDIOLOGY | Facility: HOSPITAL | Age: 67
End: 2022-07-12

## 2022-08-21 DIAGNOSIS — I10 ESSENTIAL HYPERTENSION: ICD-10-CM

## 2022-08-22 RX ORDER — TELMISARTAN AND AMLODIPINE 5; 80 MG/1; MG/1
1 TABLET ORAL DAILY
Qty: 90 TABLET | Refills: 0 | Status: SHIPPED | OUTPATIENT
Start: 2022-08-22 | End: 2022-08-23 | Stop reason: SDUPTHER

## 2022-08-23 ENCOUNTER — OFFICE VISIT (OUTPATIENT)
Dept: FAMILY MEDICINE CLINIC | Facility: CLINIC | Age: 67
End: 2022-08-23

## 2022-08-23 VITALS
OXYGEN SATURATION: 99 % | TEMPERATURE: 97.5 F | RESPIRATION RATE: 16 BRPM | HEART RATE: 75 BPM | DIASTOLIC BLOOD PRESSURE: 70 MMHG | WEIGHT: 150.8 LBS | BODY MASS INDEX: 25.74 KG/M2 | SYSTOLIC BLOOD PRESSURE: 136 MMHG | HEIGHT: 64 IN

## 2022-08-23 DIAGNOSIS — E78.2 HYPERLIPIDEMIA, MIXED: ICD-10-CM

## 2022-08-23 DIAGNOSIS — I10 ESSENTIAL HYPERTENSION: Primary | ICD-10-CM

## 2022-08-23 DIAGNOSIS — E55.9 VITAMIN D DEFICIENCY: ICD-10-CM

## 2022-08-23 DIAGNOSIS — M79.7 FIBROMYALGIA: ICD-10-CM

## 2022-08-23 DIAGNOSIS — R22.41 MASS OF RIGHT THIGH: ICD-10-CM

## 2022-08-23 PROBLEM — R11.0 NAUSEA: Status: ACTIVE | Noted: 2022-08-23

## 2022-08-23 PROBLEM — K21.9 ESOPHAGEAL REFLUX: Status: ACTIVE | Noted: 2022-08-23

## 2022-08-23 PROCEDURE — 1159F MED LIST DOCD IN RCRD: CPT | Performed by: PHYSICIAN ASSISTANT

## 2022-08-23 PROCEDURE — 99214 OFFICE O/P EST MOD 30 MIN: CPT | Performed by: PHYSICIAN ASSISTANT

## 2022-08-23 PROCEDURE — 1170F FXNL STATUS ASSESSED: CPT | Performed by: PHYSICIAN ASSISTANT

## 2022-08-23 PROCEDURE — G0009 ADMIN PNEUMOCOCCAL VACCINE: HCPCS | Performed by: PHYSICIAN ASSISTANT

## 2022-08-23 PROCEDURE — 90677 PCV20 VACCINE IM: CPT | Performed by: PHYSICIAN ASSISTANT

## 2022-08-23 PROCEDURE — G0439 PPPS, SUBSEQ VISIT: HCPCS | Performed by: PHYSICIAN ASSISTANT

## 2022-08-23 RX ORDER — MELOXICAM 15 MG/1
TABLET ORAL
Qty: 90 TABLET | Refills: 1 | Status: SHIPPED | OUTPATIENT
Start: 2022-08-23 | End: 2023-02-23 | Stop reason: SDUPTHER

## 2022-08-23 RX ORDER — TELMISARTAN AND AMLODIPINE 5; 80 MG/1; MG/1
1 TABLET ORAL DAILY
Qty: 90 TABLET | Refills: 1 | Status: SHIPPED | OUTPATIENT
Start: 2022-08-23 | End: 2023-02-23 | Stop reason: SDUPTHER

## 2022-08-23 NOTE — PATIENT INSTRUCTIONS
Medicare Wellness  Personal Prevention Plan of Service     Date of Office Visit:    Encounter Provider:  Gina Valentino PA-C  Place of Service:  [facility]  Patient Name: Boby Robles  :  1955    As part of the Medicare Wellness portion of your visit today, we are providing you with this personalized preventive plan of services (PPPS). This plan is based upon recommendations of the [place] Preventive Services Task Force (USPSTF) and the Advisory Committee on Immunization Practices (ACIP).    This lists the preventive care services that should be considered, and provides dates of when you are due. Items listed as completed are up-to-date and do not require any further intervention.    Health Maintenance   Topic Date Due    ZOSTER VACCINE (2 of 2) 2016    INFLUENZA VACCINE  10/01/2022    LIPID PANEL  2023    MAMMOGRAM  2023    ANNUAL WELLNESS VISIT  2023    DXA SCAN  2023    COLORECTAL CANCER SCREENING  2025    TDAP/TD VACCINES (2 - Td or Tdap) 2025    COVID-19 Vaccine  Completed    Pneumococcal Vaccine 65+  Completed    HEPATITIS C SCREENING  Addressed    PAP SMEAR  Discontinued       Orders Placed This Encounter   Procedures    US Nonvascular Extremity Limited     Standing Status:   Future     Standing Expiration Date:   2023     Order Specific Question:   Reason for Exam:     Answer:   mass anterior proximal right thigh about 1 year getting larger    Pneumococcal Conjugate Vaccine 20-Valent All    Comprehensive Metabolic Panel     Order Specific Question:   Release to patient     Answer:   Routine Release    CBC & Differential     Order Specific Question:   Manual Differential     Answer:   No       No follow-ups on file.        Hypertension, Adult  High blood pressure (hypertension) is when the force of blood pumping through the arteries is too strong. The arteries are the blood vessels that carry blood from the heart  "throughout the body. Hypertension forces the heart to work harder to pump blood and may cause arteries to become narrow or stiff. Untreated or uncontrolled hypertension can cause a heart attack, heart failure, a stroke, kidney disease, and other problems.  A blood pressure reading consists of a higher number over a lower number. Ideally, your blood pressure should be below 120/80. The first (\"top\") number is called the systolic pressure. It is a measure of the pressure in your arteries as your heart beats. The second (\"bottom\") number is called the diastolic pressure. It is a measure of the pressure in your arteries as the heart relaxes.  What are the causes?  The exact cause of this condition is not known. There are some conditions that result in or are related to high blood pressure.  What increases the risk?  Some risk factors for high blood pressure are under your control. The following factors may make you more likely to develop this condition:  Smoking.  Having type 2 diabetes mellitus, high cholesterol, or both.  Not getting enough exercise or physical activity.  Being overweight.  Having too much fat, sugar, calories, or salt (sodium) in your diet.  Drinking too much alcohol.  Some risk factors for high blood pressure may be difficult or impossible to change. Some of these factors include:  Having chronic kidney disease.  Having a family history of high blood pressure.  Age. Risk increases with age.  Race. You may be at higher risk if you are .  Gender. Men are at higher risk than women before age 45. After age 65, women are at higher risk than men.  Having obstructive sleep apnea.  Stress.  What are the signs or symptoms?  High blood pressure may not cause symptoms. Very high blood pressure (hypertensive crisis) may cause:  Headache.  Anxiety.  Shortness of breath.  Nosebleed.  Nausea and vomiting.  Vision changes.  Severe chest pain.  Seizures.  How is this diagnosed?  This condition is " diagnosed by measuring your blood pressure while you are seated, with your arm resting on a flat surface, your legs uncrossed, and your feet flat on the floor. The cuff of the blood pressure monitor will be placed directly against the skin of your upper arm at the level of your heart. It should be measured at least twice using the same arm. Certain conditions can cause a difference in blood pressure between your right and left arms.  Certain factors can cause blood pressure readings to be lower or higher than normal for a short period of time:  When your blood pressure is higher when you are in a health care provider's office than when you are at home, this is called white coat hypertension. Most people with this condition do not need medicines.  When your blood pressure is higher at home than when you are in a health care provider's office, this is called masked hypertension. Most people with this condition may need medicines to control blood pressure.  If you have a high blood pressure reading during one visit or you have normal blood pressure with other risk factors, you may be asked to:  Return on a different day to have your blood pressure checked again.  Monitor your blood pressure at home for 1 week or longer.  If you are diagnosed with hypertension, you may have other blood or imaging tests to help your health care provider understand your overall risk for other conditions.  How is this treated?  This condition is treated by making healthy lifestyle changes, such as eating healthy foods, exercising more, and reducing your alcohol intake. Your health care provider may prescribe medicine if lifestyle changes are not enough to get your blood pressure under control, and if:  Your systolic blood pressure is above 130.  Your diastolic blood pressure is above 80.  Your personal target blood pressure may vary depending on your medical conditions, your age, and other factors.  Follow these instructions at  home:  Eating and drinking    Eat a diet that is high in fiber and potassium, and low in sodium, added sugar, and fat. An example eating plan is called the DASH (Dietary Approaches to Stop Hypertension) diet. To eat this way:  Eat plenty of fresh fruits and vegetables. Try to fill one half of your plate at each meal with fruits and vegetables.  Eat whole grains, such as whole-wheat pasta, brown rice, or whole-grain bread. Fill about one fourth of your plate with whole grains.  Eat or drink low-fat dairy products, such as skim milk or low-fat yogurt.  Avoid fatty cuts of meat, processed or cured meats, and poultry with skin. Fill about one fourth of your plate with lean proteins, such as fish, chicken without skin, beans, eggs, or tofu.  Avoid pre-made and processed foods. These tend to be higher in sodium, added sugar, and fat.  Reduce your daily sodium intake. Most people with hypertension should eat less than 1,500 mg of sodium a day.  Do not drink alcohol if:  Your health care provider tells you not to drink.  You are pregnant, may be pregnant, or are planning to become pregnant.  If you drink alcohol:  Limit how much you use to:  0-1 drink a day for women.  0-2 drinks a day for men.  Be aware of how much alcohol is in your drink. In the [place] one drink equals one 12 oz bottle of beer (355 mL), one 5 oz glass of wine (148 mL), or one 1½ oz glass of hard liquor (44 mL).    Lifestyle    Work with your health care provider to maintain a healthy body weight or to lose weight. Ask what an ideal weight is for you.  Get at least 30 minutes of exercise most days of the week. Activities may include walking, swimming, or biking.  Include exercise to strengthen your muscles (resistance exercise), such as Pilates or lifting weights, as part of your weekly exercise routine. Try to do these types of exercises for 30 minutes at least 3 days a week.  Do not use any products that contain nicotine or tobacco, such as cigarettes,  e-cigarettes, and chewing tobacco. If you need help quitting, ask your health care provider.  Monitor your blood pressure at home as told by your health care provider.  Keep all follow-up visits as told by your health care provider. This is important.    Medicines  Take over-the-counter and prescription medicines only as told by your health care provider. Follow directions carefully. Blood pressure medicines must be taken as prescribed.  Do not skip doses of blood pressure medicine. Doing this puts you at risk for problems and can make the medicine less effective.  Ask your health care provider about side effects or reactions to medicines that you should watch for.  Contact a health care provider if you:  Think you are having a reaction to a medicine you are taking.  Have headaches that keep coming back (recurring).  Feel dizzy.  Have swelling in your ankles.  Have trouble with your vision.  Get help right away if you:  Develop a severe headache or confusion.  Have unusual weakness or numbness.  Feel faint.  Have severe pain in your chest or abdomen.  Vomit repeatedly.  Have trouble breathing.  Summary  Hypertension is when the force of blood pumping through your arteries is too strong. If this condition is not controlled, it may put you at risk for serious complications.  Your personal target blood pressure may vary depending on your medical conditions, your age, and other factors. For most people, a normal blood pressure is less than 120/80.  Hypertension is treated with lifestyle changes, medicines, or a combination of both. Lifestyle changes include losing weight, eating a healthy, low-sodium diet, exercising more, and limiting alcohol.  This information is not intended to replace advice given to you by your health care provider. Make sure you discuss any questions you have with your health care provider.  Document Revised: 08/28/2019 Document Reviewed: 08/28/2019  Elsevier Patient Education © 2021 [employer]

## 2022-08-23 NOTE — PROGRESS NOTES
Immunization  Immunization performed in LD by Maine Reynaga MA. Patient tolerated the procedure well without complications.  08/23/22   Maine Reynaga MA    Answers for HPI/ROS submitted by the patient on 8/16/2022  What is the primary reason for your visit?: High Blood Pressure

## 2022-08-23 NOTE — PROGRESS NOTES
The ABCs of the Annual Wellness Visit  Subsequent Medicare Wellness Visit    Chief Complaint   Patient presents with   • Follow-up   • Hyperlipidemia      Subjective    History of Present Illness:  Boby Robles is a 67 y.o. female who presents for a Subsequent Medicare Wellness Visit.    The following portions of the patient's history were reviewed and   updated as appropriate: allergies, current medications, past family history, past medical history, past social history, past surgical history and problem list.    Compared to one year ago, the patient feels her physical   health is the same.    Compared to one year ago, the patient feels her mental   health is the same.    Recent Hospitalizations:  She was not admitted to the hospital during the last year.       Current Medical Providers:  Patient Care Team:  Gina Valentino PA-C as PCP - General (Family Medicine)  Gina Valentino PA-C as PCP - Family Medicine  Singh Lerma MD as Consulting Physician (Orthopedic Surgery)  Eloisa Marc APRN (Nurse Practitioner)    Outpatient Medications Prior to Visit   Medication Sig Dispense Refill   • cholecalciferol (VITAMIN D3) 25 MCG (1000 UT) tablet Take 1,000 Units by mouth Daily.     • estrogens, conjugated, (Premarin) 0.625 MG tablet Take 1 tablet by mouth Daily. 1 p.o. daily 90 tablet 3   • meloxicam (MOBIC) 15 MG tablet TAKE 1/2-1 TABLET BY MOUTH EVERY DAY AS NEEDED FOR PAIN. STOP IF GI UPSET 90 tablet 1   • Multiple Vitamins-Minerals (MULTI ADULT GUMMIES PO) Take  by mouth.     • telmisartan-amLODIPine (TWYNSTA) 80-5 MG per tablet TAKE 1 TABLET BY MOUTH DAILY FOR BLOOD PRESSURE 90 tablet 0   • vitamin E 400 UNIT capsule Take 400 Units by mouth Daily.       No facility-administered medications prior to visit.       No opioid medication identified on active medication list. I have reviewed chart for other potential  high risk medication/s and harmful drug interactions in the elderly.          Aspirin is  "not on active medication list.  Aspirin use is not indicated based on review of current medical condition/s. Risk of harm outweighs potential benefits.  .    Patient Active Problem List   Diagnosis   • Allergy   • Fibromyalgia   • Hypertension   • Vitamin D deficiency   • Hyperlipidemia, mixed   • Renal insufficiency   • Periodic sialadenosis   • Esophageal reflux   • Nausea   • Hypertension     Advance Care Planning  Advance Directive is not on file.  ACP discussion was held with the patient during this visit. Patient does not have an advance directive, information provided.          Objective    Vitals:    08/23/22 1028   BP: 121/60   BP Location: Left arm   Patient Position: Sitting   Cuff Size: Adult   Pulse: 75   Resp: 16   Temp: 97.5 °F (36.4 °C)   SpO2: 99%   Weight: 68.4 kg (150 lb 12.8 oz)   Height: 162.6 cm (64.02\")     Estimated body mass index is 25.87 kg/m² as calculated from the following:    Height as of this encounter: 162.6 cm (64.02\").    Weight as of this encounter: 68.4 kg (150 lb 12.8 oz).    BMI is >= 25 and <30. (Overweight) The following options were offered after discussion;: exercise counseling/recommendations      Does the patient have evidence of cognitive impairment? No    Physical Exam            HEALTH RISK ASSESSMENT    Smoking Status:  Social History     Tobacco Use   Smoking Status Never Smoker   Smokeless Tobacco Never Used   Tobacco Comment    caffiene use 1 cup coffee daily     Alcohol Consumption:  Social History     Substance and Sexual Activity   Alcohol Use No     Fall Risk Screen:    STEADI Fall Risk Assessment was completed, and patient is at LOW risk for falls.Assessment completed on:8/23/2022    Depression Screening:  PHQ-2/PHQ-9 Depression Screening 8/23/2022   Retired PHQ-9 Total Score -   Retired Total Score -   Little Interest or Pleasure in Doing Things 0-->not at all   Feeling Down, Depressed or Hopeless 0-->not at all   PHQ-9: Brief Depression Severity Measure " Score 0       Health Habits and Functional and Cognitive Screening:  Functional & Cognitive Status 8/23/2022   Do you have difficulty preparing food and eating? No   Do you have difficulty bathing yourself, getting dressed or grooming yourself? No   Do you have difficulty using the toilet? No   Do you have difficulty moving around from place to place? No   Do you have trouble with steps or getting out of a bed or a chair? No   Current Diet Well Balanced Diet   Dental Exam Up to date   Eye Exam Up to date   Exercise (times per week) 3 times per week   Current Exercises Include Walking        Exercise Comment -   Do you need help using the phone?  No   Are you deaf or do you have serious difficulty hearing?  No   Do you need help with transportation? No   Do you need help shopping? No   Do you need help preparing meals?  No   Do you need help with housework?  No   Do you need help with laundry? No   Do you need help taking your medications? No   Do you need help managing money? No   Do you ever drive or ride in a car without wearing a seat belt? No   Have you felt unusual stress, anger or loneliness in the last month? No   Who do you live with? Spouse   If you need help, do you have trouble finding someone available to you? No   Have you been bothered in the last four weeks by sexual problems? No   Do you have difficulty concentrating, remembering or making decisions? No       Age-appropriate Screening Schedule:  Refer to the list below for future screening recommendations based on patient's age, sex and/or medical conditions. Orders for these recommended tests are listed in the plan section. The patient has been provided with a written plan.    Health Maintenance   Topic Date Due   • ZOSTER VACCINE (2 of 2) 09/23/2016   • INFLUENZA VACCINE  10/01/2022   • LIPID PANEL  02/23/2023   • MAMMOGRAM  06/08/2023   • DXA SCAN  09/17/2023   • TDAP/TD VACCINES (2 - Td or Tdap) 08/26/2025   • PAP SMEAR  Discontinued               Assessment & Plan   CMS Preventative Services Quick Reference  Risk Factors Identified During Encounter  Cardiovascular Disease  The above risks/problems have been discussed with the patient.  Follow up actions/plans if indicated are seen below in the Assessment/Plan Section.  Pertinent information has been shared with the patient in the After Visit Summary.    There are no diagnoses linked to this encounter.    Follow Up:   No follow-ups on file.     An After Visit Summary and PPPS were made available to the patient.             Answers for HPI/ROS submitted by the patient on 8/16/2022  What is the primary reason for your visit?: High Blood Pressure

## 2022-08-23 NOTE — PROGRESS NOTES
Subjective   Boby Robles is a 67 y.o. female.     History of Present Illness    Since the last visit, she has overall felt well.  She has Primary Hypertension and well controlled on current medication, Hyperlipidemia and working on this with diet and exercise and Vitamin D deficiency and labs are at goal >30 ng/mL.  she has been compliant with current medications have reviewed them.  The patient denies medication side effects.  Will refill medications. LMP  (LMP Unknown)   Last chol 9.36%  Patient had eye exam Dr. Lopez 5/3/2022  Had patient see cardiology for evaluation due to her strong family history of CAD performed EKG with some nonspecific ST abnormalities with poor R wave progression and ordered a stress test and this was performed 3-22-22  Mobic helps pain  Long term use of NSAIDs can lead to heart disease and strokes, as well as GI bleeding/ulcers, and cause kidney disease. Advise. labs to monitor renal functions to be done at least every 6 months.  Last chol score 9.36%---declines statin  Watching Na also  Failed Savella and Cymbalta   home bp 120s/70s  Fibromyalgia and OA  She wants to stay on HRT  Bactroban resolved nasal sore  Walks for exercise  Nodular area right thigh--getting larger; no pain anterior    The following portions of the patient's history were reviewed and updated as appropriate: allergies, current medications, past family history, past medical history, past social history, past surgical history and problem list.    Review of Systems   Constitutional: Negative for activity change, appetite change and unexpected weight change.   HENT: Negative for nosebleeds and trouble swallowing.    Eyes: Negative for pain and visual disturbance.   Respiratory: Negative for chest tightness, shortness of breath and wheezing.    Cardiovascular: Negative for chest pain and palpitations.   Gastrointestinal: Negative for abdominal pain and blood in stool.   Endocrine: Negative.    Genitourinary:  Negative for difficulty urinating and hematuria.   Musculoskeletal: Positive for arthralgias, back pain and joint swelling.   Skin: Positive for wound. Negative for color change and rash.   Allergic/Immunologic: Negative.    Neurological: Negative for syncope and speech difficulty.   Hematological: Negative for adenopathy.   Psychiatric/Behavioral: Negative for agitation and confusion.   All other systems reviewed and are negative.      Objective   Physical Exam  Vitals and nursing note reviewed.   Constitutional:       General: She is not in acute distress.     Appearance: Normal appearance. She is well-developed. She is not ill-appearing or toxic-appearing.   HENT:      Head: Normocephalic.      Right Ear: External ear normal.      Left Ear: External ear normal.      Nose: Nose normal.      Mouth/Throat:      Pharynx: Oropharynx is clear.   Eyes:      General: No scleral icterus.     Conjunctiva/sclera: Conjunctivae normal.      Pupils: Pupils are equal, round, and reactive to light.   Neck:      Thyroid: No thyromegaly.   Cardiovascular:      Rate and Rhythm: Normal rate and regular rhythm.      Pulses: Normal pulses.      Heart sounds: Normal heart sounds. No murmur heard.  Pulmonary:      Effort: Pulmonary effort is normal. No respiratory distress.      Breath sounds: Normal breath sounds.   Musculoskeletal:         General: Deformity (right proximal thigh--lumpy area 4-5 cm; getting larger) present. No tenderness. Normal range of motion.      Cervical back: Normal range of motion and neck supple.      Right lower leg: No edema.      Left lower leg: No edema.   Skin:     General: Skin is warm and dry.      Findings: No rash.   Neurological:      General: No focal deficit present.      Mental Status: She is alert and oriented to person, place, and time. Mental status is at baseline.   Psychiatric:         Mood and Affect: Mood normal.         Behavior: Behavior normal.         Thought Content: Thought content  normal.         Judgment: Judgment normal.         Assessment & Plan   Diagnoses and all orders for this visit:    1. Essential hypertension (Primary)  -     Comprehensive Metabolic Panel  -     CBC & Differential  -     telmisartan-amLODIPine (TWYNSTA) 80-5 MG per tablet; Take 1 tablet by mouth Daily. for blood pressure  Dispense: 90 tablet; Refill: 1    2. Fibromyalgia  -     Comprehensive Metabolic Panel  -     CBC & Differential    3. Hyperlipidemia, mixed  -     Comprehensive Metabolic Panel  -     CBC & Differential    4. Vitamin D deficiency  -     Comprehensive Metabolic Panel  -     CBC & Differential    5. Mass of right thigh  -     US Nonvascular Extremity Limited; Future    Other orders  -     Pneumococcal Conjugate Vaccine 20-Valent All  -     estrogens, conjugated, (Premarin) 0.625 MG tablet; Take 1 tablet by mouth Daily. 1 p.o. daily  Dispense: 90 tablet; Refill: 3  -     meloxicam (MOBIC) 15 MG tablet; TAKE 1/2-1 TABLET BY MOUTH EVERY DAY AS NEEDED FOR PAIN. STOP IF GI UPSET  Dispense: 90 tablet; Refill: 1      Fibromyalgia and OA---Mobic PRN works  She wants to stay on HRT  Labs today to continue Mobic we will check CMP and CBC  NEW:  Ordered ultrasound on this thigh mass to make sure its lipoma  Will update labs again in February  Plan, Boby Robles, was seen today.  she was seen for HTN and continue medication, Hyperlipidemia and will continue current medication and Vitamin D deficiency and supplemented.  Has f/u with cardio Sept         Answers for HPI/ROS submitted by the patient on 8/16/2022  What is the primary reason for your visit?: High Blood Pressure

## 2022-08-24 LAB
ALBUMIN SERPL-MCNC: 4 G/DL (ref 3.8–4.8)
ALBUMIN/GLOB SERPL: 1.3 {RATIO} (ref 1.2–2.2)
ALP SERPL-CCNC: 70 IU/L (ref 44–121)
ALT SERPL-CCNC: 12 IU/L (ref 0–32)
AST SERPL-CCNC: 21 IU/L (ref 0–40)
BASOPHILS # BLD AUTO: 0.1 X10E3/UL (ref 0–0.2)
BASOPHILS NFR BLD AUTO: 1 %
BILIRUB SERPL-MCNC: 0.4 MG/DL (ref 0–1.2)
BUN SERPL-MCNC: 14 MG/DL (ref 8–27)
BUN/CREAT SERPL: 13 (ref 12–28)
CALCIUM SERPL-MCNC: 9.8 MG/DL (ref 8.7–10.3)
CHLORIDE SERPL-SCNC: 104 MMOL/L (ref 96–106)
CO2 SERPL-SCNC: 25 MMOL/L (ref 20–29)
CREAT SERPL-MCNC: 1.09 MG/DL (ref 0.57–1)
EGFRCR-CYS SERPLBLD CKD-EPI 2021: 56 ML/MIN/1.73
EOSINOPHIL # BLD AUTO: 0.1 X10E3/UL (ref 0–0.4)
EOSINOPHIL NFR BLD AUTO: 1 %
ERYTHROCYTE [DISTWIDTH] IN BLOOD BY AUTOMATED COUNT: 11.2 % (ref 11.7–15.4)
GLOBULIN SER CALC-MCNC: 3.1 G/DL (ref 1.5–4.5)
GLUCOSE SERPL-MCNC: 82 MG/DL (ref 65–99)
HCT VFR BLD AUTO: 39.7 % (ref 34–46.6)
HGB BLD-MCNC: 12.9 G/DL (ref 11.1–15.9)
IMM GRANULOCYTES # BLD AUTO: 0 X10E3/UL (ref 0–0.1)
IMM GRANULOCYTES NFR BLD AUTO: 0 %
LYMPHOCYTES # BLD AUTO: 1.8 X10E3/UL (ref 0.7–3.1)
LYMPHOCYTES NFR BLD AUTO: 32 %
MCH RBC QN AUTO: 29.7 PG (ref 26.6–33)
MCHC RBC AUTO-ENTMCNC: 32.5 G/DL (ref 31.5–35.7)
MCV RBC AUTO: 91 FL (ref 79–97)
MONOCYTES # BLD AUTO: 0.4 X10E3/UL (ref 0.1–0.9)
MONOCYTES NFR BLD AUTO: 6 %
NEUTROPHILS # BLD AUTO: 3.3 X10E3/UL (ref 1.4–7)
NEUTROPHILS NFR BLD AUTO: 60 %
PLATELET # BLD AUTO: 245 X10E3/UL (ref 150–450)
POTASSIUM SERPL-SCNC: 4.9 MMOL/L (ref 3.5–5.2)
PROT SERPL-MCNC: 7.1 G/DL (ref 6–8.5)
RBC # BLD AUTO: 4.35 X10E6/UL (ref 3.77–5.28)
SODIUM SERPL-SCNC: 141 MMOL/L (ref 134–144)
WBC # BLD AUTO: 5.6 X10E3/UL (ref 3.4–10.8)

## 2022-08-29 ENCOUNTER — HOSPITAL ENCOUNTER (OUTPATIENT)
Dept: ULTRASOUND IMAGING | Facility: HOSPITAL | Age: 67
Discharge: HOME | End: 2022-08-29
Admitting: PHYSICIAN ASSISTANT

## 2022-08-29 DIAGNOSIS — R22.41 MASS OF RIGHT THIGH: ICD-10-CM

## 2022-08-29 PROCEDURE — 76882 US LMTD JT/FCL EVL NVASC XTR: CPT

## 2022-09-15 ENCOUNTER — OFFICE VISIT (OUTPATIENT)
Dept: CARDIOLOGY | Facility: CLINIC | Age: 67
End: 2022-09-15

## 2022-09-15 VITALS
HEART RATE: 64 BPM | WEIGHT: 150 LBS | SYSTOLIC BLOOD PRESSURE: 132 MMHG | DIASTOLIC BLOOD PRESSURE: 70 MMHG | OXYGEN SATURATION: 98 % | HEIGHT: 64 IN | BODY MASS INDEX: 25.61 KG/M2

## 2022-09-15 DIAGNOSIS — I10 PRIMARY HYPERTENSION: Primary | ICD-10-CM

## 2022-09-15 PROCEDURE — 99213 OFFICE O/P EST LOW 20 MIN: CPT | Performed by: INTERNAL MEDICINE

## 2022-09-15 NOTE — PROGRESS NOTES
"      CARDIOLOGY    Jimmie Devries MD    ENCOUNTER DATE:  09/15/2022    Boby Robles / 67 y.o. / female        CHIEF COMPLAINT / REASON FOR OFFICE VISIT     Family history of early CAD (003/15/2022 Follow up)      HISTORY OF PRESENT ILLNESS       HPI  Boby Robles is a 67 y.o. female who presents today for reevaluation.  Patient's been doing great no complaints.  She is been exercising with no issues.  Her blood pressure has been running good today it was good at 132/70.      The following portions of the patient's history were reviewed and updated as appropriate: allergies, current medications, past family history, past medical history, past social history, past surgical history and problem list.      VITAL SIGNS     Visit Vitals  /70 (BP Location: Left arm)   Pulse 64   Ht 162.6 cm (64\")   Wt 68 kg (150 lb)   LMP  (LMP Unknown)   SpO2 98%   BMI 25.75 kg/m²         Wt Readings from Last 3 Encounters:   09/15/22 68 kg (150 lb)   08/23/22 68.4 kg (150 lb 12.8 oz)   03/15/22 69.3 kg (152 lb 12.8 oz)     Body mass index is 25.75 kg/m².      REVIEW OF SYSTEMS   ROS        PHYSICAL EXAMINATION     Vitals reviewed.   Constitutional:       Appearance: Healthy appearance.   Cardiovascular:      Normal rate. Regular rhythm. Normal S1. Normal S2.      Murmurs: There is no murmur.      No gallop. No click. No rub.   Pulses:     Intact distal pulses.   Edema:     Peripheral edema absent.   Neurological:      Mental Status: Alert.           REVIEWED DATA     Procedures    Cardiac Procedures:  1.     Lipid Panel    Lipid Panel 2/23/22   Total Cholesterol 214 (A)   Triglycerides 105   HDL Cholesterol 89   VLDL Cholesterol 18   LDL Cholesterol  107 (A)   (A) Abnormal value                ASSESSMENT & PLAN      Diagnosis Plan   1. Primary hypertension           SUMMARY/DISCUSSION  1. Hypertension blood pressures good.  2. Strong family history of coronary artery disease.  Patient had a stress test back in March of " this year that was unremarkable.  Patient carries diagnosis of fibromyalgia.  From a cardiovascular standpoint she is stable encourage her to continue to exercise.  I would see her back on an as-needed basis.  The only other thing I could possibly suggest is a calcium scoring.  This is only if we will place her on a statin besides that I would see patient back on an as-needed basis.        MEDICATIONS         Discharge Medications          Accurate as of September 15, 2022  9:52 AM. If you have any questions, ask your nurse or doctor.            Continue These Medications      Instructions Start Date   cholecalciferol 25 MCG (1000 UT) tablet  Commonly known as: VITAMIN D3   1,000 Units, Oral, Daily      estrogens (conjugated) 0.625 MG tablet  Commonly known as: Premarin   0.625 mg, Oral, Daily, 1 p.o. daily      meloxicam 15 MG tablet  Commonly known as: MOBIC   TAKE 1/2-1 TABLET BY MOUTH EVERY DAY AS NEEDED FOR PAIN. STOP IF GI UPSET      MULTI ADULT GUMMIES PO   Oral      telmisartan-amLODIPine 80-5 MG per tablet  Commonly known as: TWYNSTA   1 tablet, Oral, Daily, for blood pressure      vitamin E 400 UNIT capsule   400 Units, Oral, Daily                 **Dragon Disclaimer:   Much of this encounter note is an electronic transcription/translation of spoken language to printed text. The electronic translation of spoken language may permit erroneous, or at times, nonsensical words or phrases to be inadvertently transcribed. Although I have reviewed the note for such errors, some may still exist.

## 2022-10-06 ENCOUNTER — FLU SHOT (OUTPATIENT)
Dept: FAMILY MEDICINE CLINIC | Facility: CLINIC | Age: 67
End: 2022-10-06

## 2022-10-06 DIAGNOSIS — Z23 IMMUNIZATION DUE: Primary | ICD-10-CM

## 2022-10-06 PROCEDURE — G0008 ADMIN INFLUENZA VIRUS VAC: HCPCS | Performed by: PHYSICIAN ASSISTANT

## 2022-10-06 PROCEDURE — 90662 IIV NO PRSV INCREASED AG IM: CPT | Performed by: PHYSICIAN ASSISTANT

## 2022-10-06 NOTE — PROGRESS NOTES
Immunization  Immunization performed inLD by Rolando Young MA. Patient tolerated the procedure well without complications.  10/06/22   Rolando Young MA

## 2022-12-05 ENCOUNTER — PRIOR AUTHORIZATION (OUTPATIENT)
Dept: FAMILY MEDICINE CLINIC | Facility: CLINIC | Age: 67
End: 2022-12-05

## 2022-12-05 NOTE — TELEPHONE ENCOUNTER
PA Telmisartan-amLODIPine 80-5MG tablets    The member recently filled this medication and will be able to return for their next refill according to their plan limits.

## 2023-02-23 ENCOUNTER — OFFICE VISIT (OUTPATIENT)
Dept: FAMILY MEDICINE CLINIC | Facility: CLINIC | Age: 68
End: 2023-02-23
Payer: MEDICARE

## 2023-02-23 VITALS
DIASTOLIC BLOOD PRESSURE: 64 MMHG | RESPIRATION RATE: 16 BRPM | OXYGEN SATURATION: 100 % | SYSTOLIC BLOOD PRESSURE: 122 MMHG | TEMPERATURE: 97.3 F | BODY MASS INDEX: 25.44 KG/M2 | HEIGHT: 64 IN | HEART RATE: 68 BPM | WEIGHT: 149 LBS

## 2023-02-23 DIAGNOSIS — I10 ESSENTIAL HYPERTENSION: ICD-10-CM

## 2023-02-23 DIAGNOSIS — I10 PRIMARY HYPERTENSION: Primary | ICD-10-CM

## 2023-02-23 DIAGNOSIS — E78.2 HYPERLIPIDEMIA, MIXED: ICD-10-CM

## 2023-02-23 DIAGNOSIS — Z78.0 POST-MENOPAUSAL: ICD-10-CM

## 2023-02-23 DIAGNOSIS — E55.9 VITAMIN D DEFICIENCY: ICD-10-CM

## 2023-02-23 DIAGNOSIS — N28.9 RENAL INSUFFICIENCY: ICD-10-CM

## 2023-02-23 DIAGNOSIS — Z12.31 ENCOUNTER FOR SCREENING MAMMOGRAM FOR BREAST CANCER: ICD-10-CM

## 2023-02-23 PROCEDURE — 99214 OFFICE O/P EST MOD 30 MIN: CPT | Performed by: PHYSICIAN ASSISTANT

## 2023-02-23 RX ORDER — FLUCONAZOLE 150 MG/1
150 TABLET ORAL ONCE
Qty: 1 TABLET | Refills: 2 | Status: SHIPPED | OUTPATIENT
Start: 2023-02-23 | End: 2023-02-23

## 2023-02-23 RX ORDER — AMOXICILLIN AND CLAVULANATE POTASSIUM 875; 125 MG/1; MG/1
1 TABLET, FILM COATED ORAL EVERY 12 HOURS SCHEDULED
Qty: 20 TABLET | Refills: 1 | Status: SHIPPED | OUTPATIENT
Start: 2023-02-23

## 2023-02-23 RX ORDER — MELOXICAM 15 MG/1
TABLET ORAL
Qty: 90 TABLET | Refills: 1 | Status: SHIPPED | OUTPATIENT
Start: 2023-02-23

## 2023-02-23 RX ORDER — TELMISARTAN AND AMLODIPINE 5; 80 MG/1; MG/1
1 TABLET ORAL DAILY
Qty: 90 TABLET | Refills: 1 | Status: SHIPPED | OUTPATIENT
Start: 2023-02-23

## 2023-02-23 NOTE — PROGRESS NOTES
"Subjective   Boby Robles is a 67 y.o. female.     History of Present Illness    Since the last visit, she has overall felt fairly well.  She has Primary Hypertension and well controlled on current medication, Hyperlipidemia and patient declines my medical recommendation of medication management and Vitamin D deficiency and will update labs for continued management.  she has been compliant with current medications have reviewed them.  The patient denies medication side effects.  Will refill medications. /64   Pulse 68   Temp 97.3 °F (36.3 °C)   Resp 16   Ht 162.6 cm (64\")   Wt 67.6 kg (149 lb)   LMP  (LMP Unknown)   SpO2 100%   BMI 25.58 kg/m²     Results for orders placed or performed in visit on 08/23/22   Comprehensive Metabolic Panel    Specimen: Blood   Result Value Ref Range    Glucose 82 65 - 99 mg/dL    BUN 14 8 - 27 mg/dL    Creatinine 1.09 (H) 0.57 - 1.00 mg/dL    EGFR Result 56 (L) >59 mL/min/1.73    BUN/Creatinine Ratio 13 12 - 28    Sodium 141 134 - 144 mmol/L    Potassium 4.9 3.5 - 5.2 mmol/L    Chloride 104 96 - 106 mmol/L    Total CO2 25 20 - 29 mmol/L    Calcium 9.8 8.7 - 10.3 mg/dL    Total Protein 7.1 6.0 - 8.5 g/dL    Albumin 4.0 3.8 - 4.8 g/dL    Globulin 3.1 1.5 - 4.5 g/dL    A/G Ratio 1.3 1.2 - 2.2    Total Bilirubin 0.4 0.0 - 1.2 mg/dL    Alkaline Phosphatase 70 44 - 121 IU/L    AST (SGOT) 21 0 - 40 IU/L    ALT (SGPT) 12 0 - 32 IU/L   CBC & Differential    Specimen: Blood   Result Value Ref Range    WBC 5.6 3.4 - 10.8 x10E3/uL    RBC 4.35 3.77 - 5.28 x10E6/uL    Hemoglobin 12.9 11.1 - 15.9 g/dL    Hematocrit 39.7 34.0 - 46.6 %    MCV 91 79 - 97 fL    MCH 29.7 26.6 - 33.0 pg    MCHC 32.5 31.5 - 35.7 g/dL    RDW 11.2 (L) 11.7 - 15.4 %    Platelets 245 150 - 450 x10E3/uL    Neutrophil Rel % 60 Not Estab. %    Lymphocyte Rel % 32 Not Estab. %    Monocyte Rel % 6 Not Estab. %    Eosinophil Rel % 1 Not Estab. %    Basophil Rel % 1 Not Estab. %    Neutrophils Absolute 3.3 1.4 - 7.0 " x10E3/uL    Lymphocytes Absolute 1.8 0.7 - 3.1 x10E3/uL    Monocytes Absolute 0.4 0.1 - 0.9 x10E3/uL    Eosinophils Absolute 0.1 0.0 - 0.4 x10E3/uL    Basophils Absolute 0.1 0.0 - 0.2 x10E3/uL    Immature Granulocyte Rel % 0 Not Estab. %    Immature Grans Absolute 0.0 0.0 - 0.1 x10E3/uL     Saw DR Devries  SUMMARY/DISCUSSION  1. Hypertension blood pressures good.  2. Strong family history of coronary artery disease.  Patient had a stress test back in March of this year that was unremarkable.  Patient carries diagnosis of fibromyalgia.  From a cardiovascular standpoint she is stable encourage her to continue to exercise.  I would see her back on an as-needed basis.  The only other thing I could possibly suggest is a calcium scoring.  This is only if we will place her on a statin besides that I would see patient back on an as-needed basis.   Failed Savella and Cymbalta   home bp 120s/70s  Fibromyalgia and OA---takes Mobic PRN and works---all over joint and muscle pain  She wants to stay on HRT   Patient had eye exam Dr. Lopez 5/3/2022  The following portions of the patient's history were reviewed and updated as appropriate: allergies, current medications, past family history, past medical history, past social history, past surgical history and problem list.    Review of Systems   Constitutional: Negative for activity change, appetite change and unexpected weight change.   HENT: Negative for nosebleeds and trouble swallowing.    Eyes: Negative for pain and visual disturbance.   Respiratory: Negative for chest tightness, shortness of breath and wheezing.    Cardiovascular: Negative for chest pain and palpitations.   Gastrointestinal: Negative for abdominal pain and blood in stool.   Endocrine: Negative.    Genitourinary: Negative for difficulty urinating and hematuria.   Musculoskeletal: Positive for arthralgias and myalgias.   Skin: Negative for color change and rash.   Allergic/Immunologic: Negative.     Neurological: Negative for syncope and speech difficulty.   Hematological: Negative for adenopathy.   Psychiatric/Behavioral: Negative for agitation, confusion and dysphoric mood. The patient is not nervous/anxious.    All other systems reviewed and are negative.      Objective   Physical Exam  Vitals and nursing note reviewed.   Constitutional:       General: She is not in acute distress.     Appearance: She is well-developed. She is not ill-appearing or toxic-appearing.   HENT:      Head: Normocephalic.      Right Ear: External ear normal.      Left Ear: External ear normal.      Nose: Nose normal.      Mouth/Throat:      Pharynx: Oropharynx is clear.   Eyes:      General: No scleral icterus.     Conjunctiva/sclera: Conjunctivae normal.      Pupils: Pupils are equal, round, and reactive to light.   Neck:      Thyroid: No thyromegaly.   Cardiovascular:      Rate and Rhythm: Normal rate and regular rhythm.      Heart sounds: Normal heart sounds. No murmur heard.  Pulmonary:      Effort: Pulmonary effort is normal. No respiratory distress.      Breath sounds: Normal breath sounds.   Musculoskeletal:         General: No deformity. Normal range of motion.      Cervical back: Normal range of motion and neck supple.   Skin:     General: Skin is warm and dry.      Findings: No rash.   Neurological:      General: No focal deficit present.      Mental Status: She is alert and oriented to person, place, and time. Mental status is at baseline.   Psychiatric:         Mood and Affect: Mood normal.         Behavior: Behavior normal.         Thought Content: Thought content normal.         Judgment: Judgment normal.         Assessment & Plan   Diagnoses and all orders for this visit:    1. Primary hypertension (Primary)  -     Comprehensive metabolic panel  -     Lipid panel  -     CBC and Differential  -     TSH  -     Urinalysis With Microscopic - Urine, Clean Catch  -     Vitamin D,25-Hydroxy  -     Vitamin B12  -      Folate    2. Hyperlipidemia, mixed  -     Comprehensive metabolic panel  -     Lipid panel  -     CBC and Differential  -     TSH  -     Urinalysis With Microscopic - Urine, Clean Catch  -     Vitamin D,25-Hydroxy  -     Vitamin B12  -     Folate    3. Renal insufficiency  -     Comprehensive metabolic panel  -     Lipid panel  -     CBC and Differential  -     TSH  -     Urinalysis With Microscopic - Urine, Clean Catch  -     Vitamin D,25-Hydroxy  -     Vitamin B12  -     Folate    4. Encounter for screening mammogram for breast cancer  -     Mammo Screening Digital Tomosynthesis Bilateral With CAD; Future    5. Post-menopausal  -     DEXA Bone Density Axial; Future    6. Vitamin D deficiency  -     Comprehensive metabolic panel  -     Lipid panel  -     CBC and Differential  -     TSH  -     Urinalysis With Microscopic - Urine, Clean Catch  -     Vitamin D,25-Hydroxy  -     Vitamin B12  -     Folate    Other orders  -     amoxicillin-clavulanate (Augmentin) 875-125 MG per tablet; Take 1 tablet by mouth Every 12 (Twelve) Hours. One PO BID for infection with food  Dispense: 20 tablet; Refill: 1  -     fluconazole (Diflucan) 150 MG tablet; Take 1 tablet by mouth 1 (One) Time for 1 dose. One PO X 1 for yeast infection  Dispense: 1 tablet; Refill: 2  -     estrogens, conjugated, (Premarin) 0.625 MG tablet; Take 1 tablet by mouth Daily. 1 p.o. daily  Dispense: 90 tablet; Refill: 3      Mammogram due in June  Bone density due in September for screening  We will set up calcium cardiac CT score if she desires to have this done  Okay to continue meloxicam for as needed allover muscle and joint pain works well- Long term use of NSAIDs can lead to heart disease and strokes, as well as GI bleeding/ulcers, and cause kidney disease. Advise labs to monitor renal functions to be done at least every 6 months.  We will make sure to monitor mammogram yearly patient wants to stay on hormone replacement therapy

## 2023-02-24 LAB
25(OH)D3+25(OH)D2 SERPL-MCNC: 52.2 NG/ML (ref 30–100)
ALBUMIN SERPL-MCNC: 4 G/DL (ref 3.5–5.2)
ALBUMIN/GLOB SERPL: 1.3 G/DL
ALP SERPL-CCNC: 68 U/L (ref 39–117)
ALT SERPL-CCNC: 13 U/L (ref 1–33)
APPEARANCE UR: CLEAR
AST SERPL-CCNC: 19 U/L (ref 1–32)
BACTERIA #/AREA URNS HPF: NORMAL /HPF
BASOPHILS # BLD AUTO: 0.05 10*3/MM3 (ref 0–0.2)
BASOPHILS NFR BLD AUTO: 0.8 % (ref 0–1.5)
BILIRUB SERPL-MCNC: 0.4 MG/DL (ref 0–1.2)
BILIRUB UR QL STRIP: NEGATIVE
BUN SERPL-MCNC: 13 MG/DL (ref 8–23)
BUN/CREAT SERPL: 12.6 (ref 7–25)
CALCIUM SERPL-MCNC: 9.3 MG/DL (ref 8.6–10.5)
CASTS URNS MICRO: NORMAL
CHLORIDE SERPL-SCNC: 106 MMOL/L (ref 98–107)
CHOLEST SERPL-MCNC: 203 MG/DL (ref 0–200)
CO2 SERPL-SCNC: 27.1 MMOL/L (ref 22–29)
COLOR UR: YELLOW
CREAT SERPL-MCNC: 1.03 MG/DL (ref 0.57–1)
EGFRCR SERPLBLD CKD-EPI 2021: 59.7 ML/MIN/1.73
EOSINOPHIL # BLD AUTO: 0.04 10*3/MM3 (ref 0–0.4)
EOSINOPHIL NFR BLD AUTO: 0.6 % (ref 0.3–6.2)
EPI CELLS #/AREA URNS HPF: NORMAL /HPF
ERYTHROCYTE [DISTWIDTH] IN BLOOD BY AUTOMATED COUNT: 11.1 % (ref 12.3–15.4)
FOLATE SERPL-MCNC: >20 NG/ML (ref 4.78–24.2)
GLOBULIN SER CALC-MCNC: 3 GM/DL
GLUCOSE SERPL-MCNC: 90 MG/DL (ref 65–99)
GLUCOSE UR QL STRIP: NEGATIVE
HCT VFR BLD AUTO: 35.2 % (ref 34–46.6)
HDLC SERPL-MCNC: 85 MG/DL (ref 40–60)
HGB BLD-MCNC: 11.4 G/DL (ref 12–15.9)
HGB UR QL STRIP: NEGATIVE
IMM GRANULOCYTES # BLD AUTO: 0.02 10*3/MM3 (ref 0–0.05)
IMM GRANULOCYTES NFR BLD AUTO: 0.3 % (ref 0–0.5)
KETONES UR QL STRIP: NEGATIVE
LDLC SERPL CALC-MCNC: 108 MG/DL (ref 0–100)
LEUKOCYTE ESTERASE UR QL STRIP: NEGATIVE
LYMPHOCYTES # BLD AUTO: 1.38 10*3/MM3 (ref 0.7–3.1)
LYMPHOCYTES NFR BLD AUTO: 21.2 % (ref 19.6–45.3)
MCH RBC QN AUTO: 30.2 PG (ref 26.6–33)
MCHC RBC AUTO-ENTMCNC: 32.4 G/DL (ref 31.5–35.7)
MCV RBC AUTO: 93.4 FL (ref 79–97)
MONOCYTES # BLD AUTO: 0.32 10*3/MM3 (ref 0.1–0.9)
MONOCYTES NFR BLD AUTO: 4.9 % (ref 5–12)
NEUTROPHILS # BLD AUTO: 4.7 10*3/MM3 (ref 1.7–7)
NEUTROPHILS NFR BLD AUTO: 72.2 % (ref 42.7–76)
NITRITE UR QL STRIP: NEGATIVE
NRBC BLD AUTO-RTO: 0 /100 WBC (ref 0–0.2)
PH UR STRIP: 6 [PH] (ref 5–8)
PLATELET # BLD AUTO: 349 10*3/MM3 (ref 140–450)
POTASSIUM SERPL-SCNC: 4.4 MMOL/L (ref 3.5–5.2)
PROT SERPL-MCNC: 7 G/DL (ref 6–8.5)
PROT UR QL STRIP: NEGATIVE
RBC # BLD AUTO: 3.77 10*6/MM3 (ref 3.77–5.28)
RBC #/AREA URNS HPF: NORMAL /HPF
SODIUM SERPL-SCNC: 140 MMOL/L (ref 136–145)
SP GR UR STRIP: 1.01 (ref 1–1.03)
TRIGL SERPL-MCNC: 54 MG/DL (ref 0–150)
TSH SERPL DL<=0.005 MIU/L-ACNC: 0.85 UIU/ML (ref 0.27–4.2)
UROBILINOGEN UR STRIP-MCNC: NORMAL MG/DL
VIT B12 SERPL-MCNC: 818 PG/ML (ref 211–946)
VLDLC SERPL CALC-MCNC: 10 MG/DL (ref 5–40)
WBC # BLD AUTO: 6.51 10*3/MM3 (ref 3.4–10.8)
WBC #/AREA URNS HPF: NORMAL /HPF

## 2023-04-07 RX ORDER — BACLOFEN 10 MG/1
TABLET ORAL
Qty: 30 TABLET | Refills: 11 | Status: SHIPPED | OUTPATIENT
Start: 2023-04-07

## 2023-05-10 ENCOUNTER — TELEPHONE (OUTPATIENT)
Dept: FAMILY MEDICINE CLINIC | Facility: CLINIC | Age: 68
End: 2023-05-10

## 2023-05-10 NOTE — TELEPHONE ENCOUNTER
Caller: Boby Robles    Relationship to patient: Self    Best call back number: [phone]     Patient is needing: PATIENT STATES THAT SHE HAD SAW A MESSAGE STATING SOMETHING A ABOUT A BONE DENSITY TEST. WAS WANTING TO KNOW WHAT THAT WAS REGARDING AND IF SHE NEEDED TO GET IT SCHEDULED! PLEASE ADVISE

## 2023-05-14 DIAGNOSIS — R92.8 ABNORMALITY OF LEFT BREAST ON SCREENING MAMMOGRAM: ICD-10-CM

## 2023-05-14 DIAGNOSIS — Z78.0 POST-MENOPAUSAL: ICD-10-CM

## 2023-05-14 DIAGNOSIS — Z12.31 ENCOUNTER FOR SCREENING MAMMOGRAM FOR BREAST CANCER: ICD-10-CM

## 2023-05-22 ENCOUNTER — OFFICE VISIT (OUTPATIENT)
Dept: FAMILY MEDICINE CLINIC | Facility: CLINIC | Age: 68
End: 2023-05-22
Payer: MEDICARE

## 2023-05-22 VITALS
SYSTOLIC BLOOD PRESSURE: 122 MMHG | DIASTOLIC BLOOD PRESSURE: 64 MMHG | OXYGEN SATURATION: 99 % | HEART RATE: 50 BPM | HEIGHT: 64 IN | TEMPERATURE: 97.2 F | WEIGHT: 144 LBS | RESPIRATION RATE: 16 BRPM | BODY MASS INDEX: 24.59 KG/M2

## 2023-05-22 DIAGNOSIS — S33.6XXA SPRAIN OF SACROILIAC REGION, INITIAL ENCOUNTER: ICD-10-CM

## 2023-05-22 DIAGNOSIS — M54.50 ACUTE MIDLINE LOW BACK PAIN WITHOUT SCIATICA: ICD-10-CM

## 2023-05-22 DIAGNOSIS — M79.7 FIBROMYALGIA: ICD-10-CM

## 2023-05-22 DIAGNOSIS — M54.2 NECK PAIN: Primary | ICD-10-CM

## 2023-05-22 NOTE — PROGRESS NOTES
Subjective   Boby Robles is a 67 y.o. female.     History of Present Illness   Boby Robles 67 y.o. female presents for evaluation and treatment of  low back pain. The patient first noted symptoms 6 weeks ago. It was related to fall on deck stairs at home---landed on right side.  The pain intensity is mild and moderate , and is located central, lumbar, sacroiliac and piriformis area. The pain is described as aching, spasmotic, throbbing, with paresthesia and variable intensity and occurs constantly. The symptoms are progressive. Symptoms are exacerbated by changing position and prolonged sitting, standing, and laying. Factors which relieve the pain include NSAID's and ice. Other associated symptoms include denies pain radiating down right leg, denies pain radiating down left leg, denies motor loss, denies sensory loss, denies burning in legs, denies N/T in legs, denies weakness in legs, denies loss of muscle tone and denies loss of bowel or bladder control. Previous history of symptoms: never. Treatment efforts have included NSAID's and ice , with and without relief.    Fell down deck stairs 4-9-23  Landed on right side  Want to avoid NSAIDs----renal labs    X-Ray  Interpretation report in house X-rays that I personally viewed    Relevant Clinical Issues/Diagnoses/Indications:  Fall 6 weeks ago         Clinical Findings: Cervical spine with degenerative changes and mild foraminal narrowing at C5-C7, no fracture  Thoracic spine with degenerative changes throughout no fracture and no mass  Lumbar spine only mild degenerative changes and no fracture  Hip and pelvis with mild osteoarthritis bilateral hips, minimal osteoarthritic changes in pelvis.  No fracture          Comparative Data: None          Date of Previous X-ray:    Change on current X-ray:      The following portions of the patient's history were reviewed and updated as appropriate: allergies, current medications, past family history, past medical  history, past social history, past surgical history and problem list.    Review of Systems   Constitutional: Negative for activity change, appetite change, fever and unexpected weight change.   HENT: Negative for nosebleeds and trouble swallowing.    Eyes: Negative for pain and visual disturbance.   Respiratory: Negative for chest tightness, shortness of breath and wheezing.    Cardiovascular: Negative for chest pain and palpitations.   Gastrointestinal: Positive for abdominal pain. Negative for blood in stool.   Endocrine: Negative.    Genitourinary: Positive for pelvic pain. Negative for difficulty urinating, dysuria and hematuria.   Musculoskeletal: Positive for arthralgias, back pain, gait problem, myalgias, neck pain and neck stiffness. Negative for joint swelling.   Skin: Negative for color change and rash.   Allergic/Immunologic: Negative.    Neurological: Positive for weakness. Negative for syncope, speech difficulty, numbness and headaches.   Hematological: Negative for adenopathy.   Psychiatric/Behavioral: Positive for sleep disturbance. Negative for agitation and confusion.   All other systems reviewed and are negative.      Objective   Physical Exam  Vitals and nursing note reviewed.   Constitutional:       General: She is not in acute distress.     Appearance: She is well-developed. She is not ill-appearing or toxic-appearing.   HENT:      Head: Normocephalic.      Right Ear: External ear normal.      Left Ear: External ear normal.      Nose: Nose normal.      Mouth/Throat:      Pharynx: Oropharynx is clear.   Eyes:      General: No scleral icterus.     Conjunctiva/sclera: Conjunctivae normal.      Pupils: Pupils are equal, round, and reactive to light.   Neck:      Thyroid: No thyromegaly.   Cardiovascular:      Rate and Rhythm: Normal rate and regular rhythm.      Heart sounds: Normal heart sounds. No murmur heard.  Pulmonary:      Effort: Pulmonary effort is normal. No respiratory distress.       Breath sounds: Normal breath sounds.   Musculoskeletal:         General: Tenderness present. No deformity. Normal range of motion.      Cervical back: Normal range of motion and neck supple.      Comments: Sore in lumbar and SI area; ROM pain lumbar and neck  Tight tender trapezius    Skin:     General: Skin is warm and dry.      Findings: No rash.   Neurological:      General: No focal deficit present.      Mental Status: She is alert and oriented to person, place, and time. Mental status is at baseline.   Psychiatric:         Mood and Affect: Mood normal.         Behavior: Behavior normal.         Thought Content: Thought content normal.         Judgment: Judgment normal.         Assessment & Plan   Diagnoses and all orders for this visit:    1. Neck pain (Primary)  -     XR Spine Thoracic 3 View  -     XR Spine Lumbar 2 or 3 View  -     XR Spine Cervical Complete 4 or 5 View    2. Acute midline low back pain without sciatica  -     XR Spine Thoracic 3 View  -     XR Spine Lumbar 2 or 3 View  -     XR Spine Cervical Complete 4 or 5 View  -     XR Hips Bilateral With or Without Pelvis 2 View    3. Fibromyalgia    4. Sprain of sacroiliac region, initial encounter      Since had fall---  Can do trial of taking Baclofen 10mg up to TID  Avoid NSAID---watch renal labs  Still checking hips/pelvis  Try OTC lidoderm patch 4 %--up to 3 patches  Refer PT         Answers for HPI/ROS submitted by the patient on 5/21/2023  What is the primary reason for your visit?: Back Pain

## 2023-08-23 ENCOUNTER — OFFICE VISIT (OUTPATIENT)
Dept: FAMILY MEDICINE CLINIC | Facility: CLINIC | Age: 68
End: 2023-08-23
Payer: MEDICARE

## 2023-08-23 VITALS
RESPIRATION RATE: 16 BRPM | OXYGEN SATURATION: 99 % | DIASTOLIC BLOOD PRESSURE: 76 MMHG | SYSTOLIC BLOOD PRESSURE: 134 MMHG | BODY MASS INDEX: 24.75 KG/M2 | HEIGHT: 64 IN | WEIGHT: 145 LBS | HEART RATE: 65 BPM | TEMPERATURE: 95.7 F

## 2023-08-23 DIAGNOSIS — M79.7 FIBROMYALGIA: ICD-10-CM

## 2023-08-23 DIAGNOSIS — N28.9 RENAL INSUFFICIENCY: ICD-10-CM

## 2023-08-23 DIAGNOSIS — E78.2 HYPERLIPIDEMIA, MIXED: ICD-10-CM

## 2023-08-23 DIAGNOSIS — E55.9 VITAMIN D DEFICIENCY: ICD-10-CM

## 2023-08-23 DIAGNOSIS — I10 ESSENTIAL HYPERTENSION: ICD-10-CM

## 2023-08-23 DIAGNOSIS — I10 PRIMARY HYPERTENSION: Primary | ICD-10-CM

## 2023-08-23 PROCEDURE — 1159F MED LIST DOCD IN RCRD: CPT | Performed by: PHYSICIAN ASSISTANT

## 2023-08-23 PROCEDURE — 3078F DIAST BP <80 MM HG: CPT | Performed by: PHYSICIAN ASSISTANT

## 2023-08-23 PROCEDURE — 1160F RVW MEDS BY RX/DR IN RCRD: CPT | Performed by: PHYSICIAN ASSISTANT

## 2023-08-23 PROCEDURE — 99214 OFFICE O/P EST MOD 30 MIN: CPT | Performed by: PHYSICIAN ASSISTANT

## 2023-08-23 PROCEDURE — 3075F SYST BP GE 130 - 139MM HG: CPT | Performed by: PHYSICIAN ASSISTANT

## 2023-08-23 RX ORDER — TELMISARTAN AND AMLODIPINE 5; 80 MG/1; MG/1
1 TABLET ORAL DAILY
Qty: 90 TABLET | Refills: 1 | Status: SHIPPED | OUTPATIENT
Start: 2023-08-23

## 2023-08-23 NOTE — PROGRESS NOTES
"Subjective   Boby Robles is a 68 y.o. female.     Hypertension  Pertinent negatives include no blurred vision.   Hyperlipidemia  Associated symptoms include myalgias.     Since the last visit, she has overall felt well.  She has Primary Hypertension and well controlled on current medication, Hyperlipidemia and patient declines my medical recommendation of medication management, and Vitamin D deficiency and will update labs for continued management.  she has been compliant with current medications have reviewed them.  The patient denies medication side effects.  Will refill medications. /68   Pulse 65   Temp 95.7 øF (35.4 øC)   Resp 16   Ht 162.6 cm (64.02\")   Wt 65.8 kg (145 lb)   LMP  (LMP Unknown)   SpO2 99%   BMI 24.87 kg/mý     Results for orders placed or performed in visit on 07/21/23   Comprehensive Metabolic Panel    Specimen: Blood   Result Value Ref Range    Glucose 85 65 - 99 mg/dL    BUN 10 8 - 23 mg/dL    Creatinine 1.04 (H) 0.57 - 1.00 mg/dL    EGFR Result 59.0 (L) >60.0 mL/min/1.73    BUN/Creatinine Ratio 9.6 7.0 - 25.0    Sodium 143 136 - 145 mmol/L    Potassium 3.8 3.5 - 5.2 mmol/L    Chloride 107 98 - 107 mmol/L    Total CO2 24.4 22.0 - 29.0 mmol/L    Calcium 9.9 8.6 - 10.5 mg/dL    Total Protein 7.0 6.0 - 8.5 g/dL    Albumin 4.3 3.5 - 5.2 g/dL    Globulin 2.7 gm/dL    A/G Ratio 1.6 g/dL    Total Bilirubin 0.4 0.0 - 1.2 mg/dL    Alkaline Phosphatase 66 39 - 117 U/L    AST (SGOT) 20 1 - 32 U/L    ALT (SGPT) 16 1 - 33 U/L   Uric Acid    Specimen: Blood   Result Value Ref Range    Uric Acid 4.3 2.4 - 5.7 mg/dL   CBC & Differential    Specimen: Blood   Result Value Ref Range    WBC 5.65 3.40 - 10.80 10*3/mm3    RBC 4.14 3.77 - 5.28 10*6/mm3    Hemoglobin 12.3 12.0 - 15.9 g/dL    Hematocrit 37.3 34.0 - 46.6 %    MCV 90.1 79.0 - 97.0 fL    MCH 29.7 26.6 - 33.0 pg    MCHC 33.0 31.5 - 35.7 g/dL    RDW 10.9 (L) 12.3 - 15.4 %    Platelets 278 140 - 450 10*3/mm3    Neutrophil Rel % 62.7 42.7 " - 76.0 %    Lymphocyte Rel % 29.7 19.6 - 45.3 %    Monocyte Rel % 5.1 5.0 - 12.0 %    Eosinophil Rel % 1.1 0.3 - 6.2 %    Basophil Rel % 1.2 0.0 - 1.5 %    Neutrophils Absolute 3.54 1.70 - 7.00 10*3/mm3    Lymphocytes Absolute 1.68 0.70 - 3.10 10*3/mm3    Monocytes Absolute 0.29 0.10 - 0.90 10*3/mm3    Eosinophils Absolute 0.06 0.00 - 0.40 10*3/mm3    Basophils Absolute 0.07 0.00 - 0.20 10*3/mm3    Immature Granulocyte Rel % 0.2 0.0 - 0.5 %    Immature Grans Absolute 0.01 0.00 - 0.05 10*3/mm3    nRBC 0.0 0.0 - 0.2 /100 WBC   Declines statin  Consider exercise plan    Saw Manuela 7-21-23-----sore right 5th finger---pain with edema----had labs---normal uric acid. -----no pain now but stiff---  She is still taking Mobic PRN---want her to avoid  Saw ortho for back pain-- [facility];  advised Tens unit---did xrays    Saw DR Devries  SUMMARY/DISCUSSION  Hypertension blood pressures good.  Strong family history of coronary artery disease.  Patient had a stress test back in March of this year that was unremarkable.  Patient carries diagnosis of fibromyalgia.  From a cardiovascular standpoint she is stable encourage her to continue to exercise.  I would see her back on an as-needed basis.  The only other thing I could possibly suggest is a calcium scoring.  This is only if we will place her on a statin besides that I would see patient back on an as-needed basis.   Failed Savella and Cymbalta   home bp 120s/70s  Fibromyalgia and OA---takes Mobic PRN and works---all over joint and muscle pain  She wants to stay on HRT  The following portions of the patient's history were reviewed and updated as appropriate: allergies, current medications, past family history, past medical history, past social history, past surgical history, and problem list.    Review of Systems   Constitutional:  Negative for diaphoresis.   HENT:  Negative for nosebleeds and trouble swallowing.    Eyes:  Negative for blurred vision and visual  disturbance.   Respiratory:  Negative for choking.    Gastrointestinal:  Negative for blood in stool.   Musculoskeletal:  Positive for arthralgias, back pain, joint swelling and myalgias.   Allergic/Immunologic: Negative for immunocompromised state.   Neurological:  Negative for facial asymmetry and speech difficulty.   Psychiatric/Behavioral:  Negative for self-injury and suicidal ideas.      Objective   Physical Exam  Vitals and nursing note reviewed.   Constitutional:       General: She is not in acute distress.     Appearance: Normal appearance. She is well-developed. She is not ill-appearing or toxic-appearing.   HENT:      Head: Normocephalic.      Right Ear: External ear normal.      Left Ear: External ear normal.      Nose: Nose normal.      Mouth/Throat:      Pharynx: Oropharynx is clear.   Eyes:      General: No scleral icterus.     Conjunctiva/sclera: Conjunctivae normal.      Pupils: Pupils are equal, round, and reactive to light.   Neck:      Thyroid: No thyromegaly.      Vascular: No carotid bruit.   Cardiovascular:      Rate and Rhythm: Normal rate and regular rhythm.      Heart sounds: Normal heart sounds. No murmur heard.  Pulmonary:      Effort: Pulmonary effort is normal. No respiratory distress.      Breath sounds: Normal breath sounds.   Musculoskeletal:         General: No deformity. Normal range of motion.      Cervical back: Normal range of motion and neck supple.   Skin:     General: Skin is warm and dry.      Findings: No rash.   Neurological:      General: No focal deficit present.      Mental Status: She is alert and oriented to person, place, and time. Mental status is at baseline.   Psychiatric:         Mood and Affect: Mood normal.         Behavior: Behavior normal.         Thought Content: Thought content normal.         Judgment: Judgment normal.         Assessment & Plan   Diagnoses and all orders for this visit:    1. Primary hypertension (Primary)  -     Comprehensive metabolic  panel; Future  -     Lipid panel; Future  -     CBC and Differential; Future  -     TSH; Future  -     T4, Free; Future  -     Urinalysis With Microscopic - Urine, Clean Catch; Future  -     Vitamin D,25-Hydroxy; Future  -     Vitamin B12; Future  -     Folate; Future  -     Uric Acid; Future    2. Hyperlipidemia, mixed  -     Comprehensive metabolic panel; Future  -     Lipid panel; Future  -     CBC and Differential; Future  -     TSH; Future  -     T4, Free; Future  -     Urinalysis With Microscopic - Urine, Clean Catch; Future  -     Vitamin D,25-Hydroxy; Future  -     Vitamin B12; Future  -     Folate; Future  -     Uric Acid; Future    3. Renal insufficiency  -     Comprehensive metabolic panel; Future  -     Lipid panel; Future  -     CBC and Differential; Future  -     TSH; Future  -     T4, Free; Future  -     Urinalysis With Microscopic - Urine, Clean Catch; Future  -     Vitamin D,25-Hydroxy; Future  -     Vitamin B12; Future  -     Folate; Future  -     Uric Acid; Future    4. Vitamin D deficiency  -     Comprehensive metabolic panel; Future  -     Lipid panel; Future  -     CBC and Differential; Future  -     TSH; Future  -     T4, Free; Future  -     Urinalysis With Microscopic - Urine, Clean Catch; Future  -     Vitamin D,25-Hydroxy; Future  -     Vitamin B12; Future  -     Folate; Future  -     Uric Acid; Future    5. Fibromyalgia  -     Comprehensive metabolic panel; Future  -     Lipid panel; Future  -     CBC and Differential; Future  -     TSH; Future  -     T4, Free; Future  -     Urinalysis With Microscopic - Urine, Clean Catch; Future  -     Vitamin D,25-Hydroxy; Future  -     Vitamin B12; Future  -     Folate; Future  -     Uric Acid; Future    6. Essential hypertension  -     telmisartan-amLODIPine (TWYNSTA) 80-5 MG per tablet; Take 1 tablet by mouth Daily. for blood pressure  (I10)  Dispense: 90 tablet; Refill: 1  -     Comprehensive metabolic panel; Future  -     Lipid panel; Future  -      CBC and Differential; Future  -     TSH; Future  -     T4, Free; Future  -     Urinalysis With Microscopic - Urine, Clean Catch; Future  -     Vitamin D,25-Hydroxy; Future  -     Vitamin B12; Future  -     Folate; Future  -     Uric Acid; Future      Avoid NSAIDs- watch renal labs  Watch for gout right 5th finger---may reoccur--uric acid was normal  DEXA due next mo  Wants to stay on HRT  Labs Feb  Plan, Boby Robles, was seen today.  she was seen for HTN and continue medication, Hyperlipidemia and decline my recommendation of medication for treatment, and Vitamin D deficiency and will update labs .  Will watch uric acid level     Answers submitted by the patient for this visit:  Primary Reason for Visit (Submitted on 8/21/2023)  What is the primary reason for your visit?: High Blood Pressure

## 2023-08-23 NOTE — PATIENT INSTRUCTIONS
"Hypertension, Adult  High blood pressure (hypertension) is when the force of blood pumping through the arteries is too strong. The arteries are the blood vessels that carry blood from the heart throughout the body. Hypertension forces the heart to work harder to pump blood and may cause arteries to become narrow or stiff. Untreated or uncontrolled hypertension can lead to a heart attack, heart failure, a stroke, kidney disease, and other problems.  A blood pressure reading consists of a higher number over a lower number. Ideally, your blood pressure should be below 120/80. The first (\"top\") number is called the systolic pressure. It is a measure of the pressure in your arteries as your heart beats. The second (\"bottom\") number is called the diastolic pressure. It is a measure of the pressure in your arteries as the heart relaxes.  What are the causes?  The exact cause of this condition is not known. There are some conditions that result in high blood pressure.  What increases the risk?  Certain factors may make you more likely to develop high blood pressure. Some of these risk factors are under your control, including:  Smoking.  Not getting enough exercise or physical activity.  Being overweight.  Having too much fat, sugar, calories, or salt (sodium) in your diet.  Drinking too much alcohol.  Other risk factors include:  Having a personal history of heart disease, diabetes, high cholesterol, or kidney disease.  Stress.  Having a family history of high blood pressure and high cholesterol.  Having obstructive sleep apnea.  Age. The risk increases with age.  What are the signs or symptoms?  High blood pressure may not cause symptoms. Very high blood pressure (hypertensive crisis) may cause:  Headache.  Fast or irregular heartbeats (palpitations).  Shortness of breath.  Nosebleed.  Nausea and vomiting.  Vision changes.  Severe chest pain, dizziness, and seizures.  How is this diagnosed?  This condition is diagnosed by " measuring your blood pressure while you are seated, with your arm resting on a flat surface, your legs uncrossed, and your feet flat on the floor. The cuff of the blood pressure monitor will be placed directly against the skin of your upper arm at the level of your heart. Blood pressure should be measured at least twice using the same arm. Certain conditions can cause a difference in blood pressure between your right and left arms.  If you have a high blood pressure reading during one visit or you have normal blood pressure with other risk factors, you may be asked to:  Return on a different day to have your blood pressure checked again.  Monitor your blood pressure at home for 1 week or longer.  If you are diagnosed with hypertension, you may have other blood or imaging tests to help your health care provider understand your overall risk for other conditions.  How is this treated?  This condition is treated by making healthy lifestyle changes, such as eating healthy foods, exercising more, and reducing your alcohol intake. You may be referred for counseling on a healthy diet and physical activity.  Your health care provider may prescribe medicine if lifestyle changes are not enough to get your blood pressure under control and if:  Your systolic blood pressure is above 130.  Your diastolic blood pressure is above 80.  Your personal target blood pressure may vary depending on your medical conditions, your age, and other factors.  Follow these instructions at home:  Eating and drinking    Eat a diet that is high in fiber and potassium, and low in sodium, added sugar, and fat. An example of this eating plan is called the DASH diet. DASH stands for Dietary Approaches to Stop Hypertension. To eat this way:  Eat plenty of fresh fruits and vegetables. Try to fill one half of your plate at each meal with fruits and vegetables.  Eat whole grains, such as whole-wheat pasta, brown rice, or whole-grain bread. Fill about one  fourth of your plate with whole grains.  Eat or drink low-fat dairy products, such as skim milk or low-fat yogurt.  Avoid fatty cuts of meat, processed or cured meats, and poultry with skin. Fill about one fourth of your plate with lean proteins, such as fish, chicken without skin, beans, eggs, or tofu.  Avoid pre-made and processed foods. These tend to be higher in sodium, added sugar, and fat.  Reduce your daily sodium intake. Many people with hypertension should eat less than 1,500 mg of sodium a day.  Do not drink alcohol if:  Your health care provider tells you not to drink.  You are pregnant, may be pregnant, or are planning to become pregnant.  If you drink alcohol:  Limit how much you have to:  0-1 drink a day for women.  0-2 drinks a day for men.  Know how much alcohol is in your drink. In the [place] one drink equals one 12 oz bottle of beer (355 mL), one 5 oz glass of wine (148 mL), or one 1« oz glass of hard liquor (44 mL).  Lifestyle    Work with your health care provider to maintain a healthy body weight or to lose weight. Ask what an ideal weight is for you.  Get at least 30 minutes of exercise that causes your heart to beat faster (aerobic exercise) most days of the week. Activities may include walking, swimming, or biking.  Include exercise to strengthen your muscles (resistance exercise), such as Pilates or lifting weights, as part of your weekly exercise routine. Try to do these types of exercises for 30 minutes at least 3 days a week.  Do not use any products that contain nicotine or tobacco. These products include cigarettes, chewing tobacco, and vaping devices, such as e-cigarettes. If you need help quitting, ask your health care provider.  Monitor your blood pressure at home as told by your health care provider.  Keep all follow-up visits. This is important.  Medicines  Take over-the-counter and prescription medicines only as told by your health care provider. Follow directions carefully. Blood  pressure medicines must be taken as prescribed.  Do not skip doses of blood pressure medicine. Doing this puts you at risk for problems and can make the medicine less effective.  Ask your health care provider about side effects or reactions to medicines that you should watch for.  Contact a health care provider if you:  Think you are having a reaction to a medicine you are taking.  Have headaches that keep coming back (recurring).  Feel dizzy.  Have swelling in your ankles.  Have trouble with your vision.  Get help right away if you:  Develop a severe headache or confusion.  Have unusual weakness or numbness.  Feel faint.  Have severe pain in your chest or abdomen.  Vomit repeatedly.  Have trouble breathing.  These symptoms may be an emergency. Get help right away. Call 911.  Do not wait to see if the symptoms will go away.  Do not drive yourself to the hospital.  Summary  Hypertension is when the force of blood pumping through your arteries is too strong. If this condition is not controlled, it may put you at risk for serious complications.  Your personal target blood pressure may vary depending on your medical conditions, your age, and other factors. For most people, a normal blood pressure is less than 120/80.  Hypertension is treated with lifestyle changes, medicines, or a combination of both. Lifestyle changes include losing weight, eating a healthy, low-sodium diet, exercising more, and limiting alcohol.  This information is not intended to replace advice given to you by your health care provider. Make sure you discuss any questions you have with your health care provider.  Document Revised: 10/25/2022 Document Reviewed: 10/25/2022  [employer] Patient Education c 2023 [employer]

## 2023-09-15 RX ORDER — CONJUGATED ESTROGENS 0.62 MG/1
TABLET, FILM COATED ORAL
Qty: 90 TABLET | Refills: 3 | Status: SHIPPED | OUTPATIENT
Start: 2023-09-15

## 2023-09-27 ENCOUNTER — TELEPHONE (OUTPATIENT)
Dept: FAMILY MEDICINE CLINIC | Facility: CLINIC | Age: 68
End: 2023-09-27
Payer: MEDICARE

## 2023-09-27 NOTE — TELEPHONE ENCOUNTER
Caller: Boby Robles    Relationship: Self    Best call back number: [phone]    Requested Prescriptions:   Requested Prescriptions     Pending Prescriptions Disp Refills    estrogens, conjugated, (Premarin) 0.625 MG tablet 90 tablet 3     Sig: Take 1 tablet by mouth Daily. Take daily for 21 days then do not take for 7 days.        Pharmacy where request should be sent: [id number] - [facility], [place] - [address] AT [facility] [phone] [facility] [phone] FX     Last office visit with prescribing clinician: 8/23/2023   Last telemedicine visit with prescribing clinician: Visit date not found   Next office visit with prescribing clinician: Visit date not found     Additional details provided by patient: THE PATIENT STATES THAT [facility] IS STILL WAITING ON AUTHORIZATION TO HAVE THIS MEDICATION FILLED. THE PATIENT IS DOWN TO HER LAST 3 TABLETS. PLEASE ADVISE.     Does the patient have less than a 3 day supply:  [x] Yes  [] No    Would you like a call back once the refill request has been completed: [x] Yes [] No    If the office needs to give you a call back, can they leave a voicemail: [x] Yes [] No    Marifer Hanh Rep   09/27/23 14:19 EDT

## 2024-02-22 ENCOUNTER — HOSPITAL ENCOUNTER (OUTPATIENT)
Dept: BONE DENSITY | Facility: HOSPITAL | Age: 69
Discharge: HOME | End: 2024-02-22
Admitting: PHYSICIAN ASSISTANT
Payer: MEDICARE

## 2024-02-22 PROCEDURE — 77080 DXA BONE DENSITY AXIAL: CPT

## 2024-05-17 DIAGNOSIS — I10 ESSENTIAL HYPERTENSION: ICD-10-CM

## 2024-05-17 RX ORDER — TELMISARTAN AND AMLODIPINE 5; 80 MG/1; MG/1
1 TABLET ORAL DAILY
Qty: 30 TABLET | Refills: 0 | Status: SHIPPED | OUTPATIENT
Start: 2024-05-17

## 2024-06-04 DIAGNOSIS — R92.8 ABNORMAL MAMMOGRAM: Primary | ICD-10-CM

## 2024-06-05 ENCOUNTER — TELEPHONE (OUTPATIENT)
Dept: FAMILY MEDICINE CLINIC | Facility: CLINIC | Age: 69
End: 2024-06-05
Payer: MEDICARE

## 2024-06-05 NOTE — TELEPHONE ENCOUNTER
Caller: Boby Robles    Relationship: Self    Best call back number: [phone]     What is the best time to reach you: ANYTIME 06- ANYTIME BEFORE NOON ON 06-    Who are you requesting to speak with (clinical staff, provider,  specific staff member): CAROLE MARRERO OR CLINICAL    What was the call regarding: PATIENT STATED SHE RECEIVED A NOTE IN [facility] THAT ANOTHER VIEW MUST BE TAKEN FOR HER MAMMOGRAM, AND THAT AN ULTRASOUND MAY BE NEEDED FOR THE LEFT BREAST DUE TO A FOG.    PATIENT IS REQUESTING TO KNOW IF A REFERRAL MUST BE ENTERED FOR THIS, OR WHAT SHE MUST DO.    PATIENT IS ALSO REQUESTING TO KNOW HOW THIS WOULD WORK WITH HER INSURANCE.    PLEASE ADVISE ASAP.    Is it okay if the provider responds through [employer]: YES

## 2024-06-05 NOTE — TELEPHONE ENCOUNTER
Spoke with patient to inform her that her provider has place the new referral for an US for left breast as well as Mammo Diagnostic Digital Tomosynthesis left with CAD our referral team will work on the approval from her insurance and once the approval is given she would be contacted to schedule. Patient verbalized understanding and had no further question for our office

## 2024-06-18 ENCOUNTER — OFFICE VISIT (OUTPATIENT)
Dept: FAMILY MEDICINE CLINIC | Facility: CLINIC | Age: 69
End: 2024-06-18
Payer: MEDICARE

## 2024-06-18 VITALS
DIASTOLIC BLOOD PRESSURE: 62 MMHG | BODY MASS INDEX: 24.59 KG/M2 | OXYGEN SATURATION: 97 % | HEART RATE: 64 BPM | HEIGHT: 64 IN | WEIGHT: 144 LBS | RESPIRATION RATE: 16 BRPM | SYSTOLIC BLOOD PRESSURE: 112 MMHG

## 2024-06-18 DIAGNOSIS — Z79.890 POSTMENOPAUSAL HRT (HORMONE REPLACEMENT THERAPY): ICD-10-CM

## 2024-06-18 DIAGNOSIS — I10 ESSENTIAL HYPERTENSION: Primary | ICD-10-CM

## 2024-06-18 PROCEDURE — 1160F RVW MEDS BY RX/DR IN RCRD: CPT | Performed by: NURSE PRACTITIONER

## 2024-06-18 PROCEDURE — 3078F DIAST BP <80 MM HG: CPT | Performed by: NURSE PRACTITIONER

## 2024-06-18 PROCEDURE — 3074F SYST BP LT 130 MM HG: CPT | Performed by: NURSE PRACTITIONER

## 2024-06-18 PROCEDURE — 99213 OFFICE O/P EST LOW 20 MIN: CPT | Performed by: NURSE PRACTITIONER

## 2024-06-18 PROCEDURE — 1159F MED LIST DOCD IN RCRD: CPT | Performed by: NURSE PRACTITIONER

## 2024-06-18 RX ORDER — TELMISARTAN AND AMLODIPINE 5; 80 MG/1; MG/1
1 TABLET ORAL DAILY
Qty: 90 TABLET | Refills: 3 | Status: SHIPPED | OUTPATIENT
Start: 2024-06-18

## 2024-06-18 NOTE — PROGRESS NOTES
"Subjective   Boby Robles is a 68 y.o. female.     Hypertension  Pertinent negatives include no chest pain, palpitations or shortness of breath.      Answers submitted by the patient for this visit:  Other (Submitted on 6/11/2024)  Please describe your symptoms.: Prescription refills  Have you had these symptoms before?: Yes  How long have you been having these symptoms?: Greater than 2 weeks  Please list any medications you are currently taking for this condition.: Premarin 0625mg  & Telemasartin Amlodipine 80/5mgs  Primary Reason for Visit (Submitted on 6/11/2024)  What is the primary reason for your visit?: Other     Since the last visit, she has overall felt well.  She has Primary Hypertension and well controlled on current medication.  She also continues her hormone replacement therapy and has felt well on current dose of Premarin.  she has been compliant with current medications have reviewed them.  The patient denies medication side effects.  Will refill medications. /62   Pulse 64   Resp 16   Ht 162.6 cm (64.02\")   Wt 65.3 kg (144 lb)   LMP  (LMP Unknown)   SpO2 97%   BMI 24.70 kg/m² .  BMI is within normal parameters. No other follow-up for BMI required.      Results for orders placed or performed in visit on 07/21/23   Comprehensive Metabolic Panel    Specimen: Blood   Result Value Ref Range    Glucose 85 65 - 99 mg/dL    BUN 10 8 - 23 mg/dL    Creatinine 1.04 (H) 0.57 - 1.00 mg/dL    EGFR Result 59.0 (L) >60.0 mL/min/1.73    BUN/Creatinine Ratio 9.6 7.0 - 25.0    Sodium 143 136 - 145 mmol/L    Potassium 3.8 3.5 - 5.2 mmol/L    Chloride 107 98 - 107 mmol/L    Total CO2 24.4 22.0 - 29.0 mmol/L    Calcium 9.9 8.6 - 10.5 mg/dL    Total Protein 7.0 6.0 - 8.5 g/dL    Albumin 4.3 3.5 - 5.2 g/dL    Globulin 2.7 gm/dL    A/G Ratio 1.6 g/dL    Total Bilirubin 0.4 0.0 - 1.2 mg/dL    Alkaline Phosphatase 66 39 - 117 U/L    AST (SGOT) 20 1 - 32 U/L    ALT (SGPT) 16 1 - 33 U/L   Uric Acid    Specimen: " Blood   Result Value Ref Range    Uric Acid 4.3 2.4 - 5.7 mg/dL   CBC & Differential    Specimen: Blood   Result Value Ref Range    WBC 5.65 3.40 - 10.80 10*3/mm3    RBC 4.14 3.77 - 5.28 10*6/mm3    Hemoglobin 12.3 12.0 - 15.9 g/dL    Hematocrit 37.3 34.0 - 46.6 %    MCV 90.1 79.0 - 97.0 fL    MCH 29.7 26.6 - 33.0 pg    MCHC 33.0 31.5 - 35.7 g/dL    RDW 10.9 (L) 12.3 - 15.4 %    Platelets 278 140 - 450 10*3/mm3    Neutrophil Rel % 62.7 42.7 - 76.0 %    Lymphocyte Rel % 29.7 19.6 - 45.3 %    Monocyte Rel % 5.1 5.0 - 12.0 %    Eosinophil Rel % 1.1 0.3 - 6.2 %    Basophil Rel % 1.2 0.0 - 1.5 %    Neutrophils Absolute 3.54 1.70 - 7.00 10*3/mm3    Lymphocytes Absolute 1.68 0.70 - 3.10 10*3/mm3    Monocytes Absolute 0.29 0.10 - 0.90 10*3/mm3    Eosinophils Absolute 0.06 0.00 - 0.40 10*3/mm3    Basophils Absolute 0.07 0.00 - 0.20 10*3/mm3    Immature Granulocyte Rel % 0.2 0.0 - 0.5 %    Immature Grans Absolute 0.01 0.00 - 0.05 10*3/mm3    nRBC 0.0 0.0 - 0.2 /100 WBC         She is up-to-date on mammogram and bone density scan.  She had abnormal area on mammogram and has repeat scheduled.    She already has lab order in chart and has been fasting today so we will complete this while she is here.    The following portions of the patient's history were reviewed and updated as appropriate: allergies, current medications, past family history, past medical history, past social history, past surgical history, and problem list.    Review of Systems   Constitutional:  Negative for unexpected weight change.   Respiratory:  Negative for shortness of breath.    Cardiovascular:  Negative for chest pain and palpitations.   Psychiatric/Behavioral:  Negative for behavioral problems.        Objective   Physical Exam  Vitals and nursing note reviewed.   Constitutional:       Appearance: Normal appearance. She is well-developed.   Neck:      Vascular: No carotid bruit.   Cardiovascular:      Rate and Rhythm: Normal rate and regular rhythm.    Pulmonary:      Effort: Pulmonary effort is normal.      Breath sounds: Normal breath sounds.   Neurological:      Mental Status: She is alert and oriented to person, place, and time.   Psychiatric:         Mood and Affect: Mood normal.         Behavior: Behavior normal.         Thought Content: Thought content normal.         Judgment: Judgment normal.         Assessment & Plan   Diagnoses and all orders for this visit:    1. Essential hypertension (Primary)  -     telmisartan-amLODIPine (TWYNSTA) 80-5 MG per tablet; Take 1 tablet by mouth Daily. for blood pressure.  And make appointment  Dispense: 90 tablet; Refill: 3    2. Postmenopausal HRT (hormone replacement therapy)  -     estrogens, conjugated, (Premarin) 0.625 MG tablet; Take 1 tablet by mouth Daily. Take daily for 21 days then do not take for 7 days.  Dispense: 90 tablet; Refill: 3

## 2024-08-15 DIAGNOSIS — R92.8 ABNORMAL MAMMOGRAM: Primary | ICD-10-CM

## 2024-08-22 ENCOUNTER — OFFICE VISIT (OUTPATIENT)
Dept: FAMILY MEDICINE CLINIC | Facility: CLINIC | Age: 69
End: 2024-08-22
Payer: MEDICARE

## 2024-08-22 VITALS
SYSTOLIC BLOOD PRESSURE: 141 MMHG | RESPIRATION RATE: 16 BRPM | WEIGHT: 144 LBS | TEMPERATURE: 97.1 F | HEIGHT: 64 IN | DIASTOLIC BLOOD PRESSURE: 70 MMHG | BODY MASS INDEX: 24.59 KG/M2 | HEART RATE: 61 BPM | OXYGEN SATURATION: 99 %

## 2024-08-22 DIAGNOSIS — M79.7 FIBROMYALGIA: Chronic | ICD-10-CM

## 2024-08-22 DIAGNOSIS — N18.31 STAGE 3A CHRONIC KIDNEY DISEASE: Primary | Chronic | ICD-10-CM

## 2024-08-22 DIAGNOSIS — Z00.00 MEDICARE ANNUAL WELLNESS VISIT, SUBSEQUENT: ICD-10-CM

## 2024-08-22 DIAGNOSIS — D64.9 LOW HEMOGLOBIN: ICD-10-CM

## 2024-08-22 NOTE — PROGRESS NOTES
"Subjective   Boby Robles is a 69 y.o. female.     History of Present Illness      Since the last visit, she has overall felt well.  She has  history of primary hypertension well-controlled on medication and Sybil refilled her medications at her last visit following up on the labs she had same day on June 18 noting renal functions persist to the same range consistent with chronic kidney disease stage III yesaurabh my plan is to order renal ultrasound look at the structure of her kidneys and to get labs again in 6 months also monitoring her hemoglobin and will order iron studies just to prove they are in range plan also to get urine microalbumin creatinine ratio noting June labs without protein on regular urinalysis.  Also want her to consider compound cream for fibromyalgia pain if needed in future to avoid NSAIDs. .  she has been compliant with current medications have reviewed them.  The patient denies medication side effects.  Will refill medications. /64   Pulse 61   Temp 97.1 °F (36.2 °C)   Resp 16   Ht 162.6 cm (64.02\")   Wt 65.3 kg (144 lb)   LMP  (LMP Unknown)   SpO2 99%   BMI 24.70 kg/m² .  BMI is within normal parameters. No other follow-up for BMI required.      Results for orders placed or performed in visit on 01/15/24   Comprehensive metabolic panel    Specimen: Blood   Result Value Ref Range    Glucose 92 65 - 99 mg/dL    BUN 12 8 - 23 mg/dL    Creatinine 1.07 (H) 0.57 - 1.00 mg/dL    EGFR Result 56.7 (L) >60.0 mL/min/1.73    BUN/Creatinine Ratio 11.2 7.0 - 25.0    Sodium 141 136 - 145 mmol/L    Potassium 4.3 3.5 - 5.2 mmol/L    Chloride 106 98 - 107 mmol/L    Total CO2 24.1 22.0 - 29.0 mmol/L    Calcium 9.4 8.6 - 10.5 mg/dL    Total Protein 6.6 6.0 - 8.5 g/dL    Albumin 4.1 3.5 - 5.2 g/dL    Globulin 2.5 gm/dL    A/G Ratio 1.6 g/dL    Total Bilirubin 0.3 0.0 - 1.2 mg/dL    Alkaline Phosphatase 65 39 - 117 U/L    AST (SGOT) 23 1 - 32 U/L    ALT (SGPT) 16 1 - 33 U/L   Lipid panel    Specimen: " Blood   Result Value Ref Range    Total Cholesterol 199 0 - 200 mg/dL    Triglycerides 54 0 - 150 mg/dL    HDL Cholesterol 90 (H) 40 - 60 mg/dL    VLDL Cholesterol Alcides 10 5 - 40 mg/dL    LDL Chol Calc (NIH) 99 0 - 100 mg/dL   TSH    Specimen: Blood   Result Value Ref Range    TSH 1.110 0.270 - 4.200 uIU/mL   T4, Free    Specimen: Blood   Result Value Ref Range    Free T4 1.24 0.92 - 1.68 ng/dL   Vitamin D,25-Hydroxy    Specimen: Blood   Result Value Ref Range    25 Hydroxy, Vitamin D 45.9 30.0 - 100.0 ng/mL   Vitamin B12    Specimen: Blood   Result Value Ref Range    Vitamin B-12 795 211 - 946 pg/mL   Folate    Specimen: Blood   Result Value Ref Range    Folate >20.00 4.78 - 24.20 ng/mL   Uric Acid    Specimen: Blood   Result Value Ref Range    Uric Acid 4.7 2.4 - 5.7 mg/dL   Microscopic Examination -   Result Value Ref Range    WBC, UA 0-2 /HPF    RBC, UA 0-2 /HPF    Epithelial Cells (non renal) 3-6 (A) /HPF    Cast Type Comment     Bacteria, UA Trace (A) None Seen /HPF   CBC and Differential    Specimen: Blood   Result Value Ref Range    WBC 4.88 3.40 - 10.80 10*3/mm3    RBC 3.97 3.77 - 5.28 10*6/mm3    Hemoglobin 11.7 (L) 12.0 - 15.9 g/dL    Hematocrit 37.2 34.0 - 46.6 %    MCV 93.7 79.0 - 97.0 fL    MCH 29.5 26.6 - 33.0 pg    MCHC 31.5 31.5 - 35.7 g/dL    RDW 11.1 (L) 12.3 - 15.4 %    Platelets 232 140 - 450 10*3/mm3    Neutrophil Rel % 61.4 42.7 - 76.0 %    Lymphocyte Rel % 29.9 19.6 - 45.3 %    Monocyte Rel % 5.3 5.0 - 12.0 %    Eosinophil Rel % 2.0 0.3 - 6.2 %    Basophil Rel % 1.2 0.0 - 1.5 %    Neutrophils Absolute 2.99 1.70 - 7.00 10*3/mm3    Lymphocytes Absolute 1.46 0.70 - 3.10 10*3/mm3    Monocytes Absolute 0.26 0.10 - 0.90 10*3/mm3    Eosinophils Absolute 0.10 0.00 - 0.40 10*3/mm3    Basophils Absolute 0.06 0.00 - 0.20 10*3/mm3    Immature Granulocyte Rel % 0.2 0.0 - 0.5 %    Immature Grans Absolute 0.01 0.00 - 0.05 10*3/mm3    nRBC 0.0 0.0 - 0.2 /100 WBC   Urinalysis With Microscopic - Urine, Clean  Catch    Specimen: Urine, Clean Catch   Result Value Ref Range    Specific Gravity, UA 1.019 1.005 - 1.030    pH, UA 6.5 5.0 - 8.0    Color, UA Yellow     Appearance, UA Clear Clear    Leukocytes, UA Negative Negative    Protein Negative Negative    Glucose, UA Negative Negative    Ketones Negative Negative    Blood, UA Negative Negative    Bilirubin, UA Negative Negative    Urobilinogen, UA Comment     Nitrite, UA Negative Negative     DEXA 1-83-88----normal  Mammo--f/u neg     The following portions of the patient's history were reviewed and updated as appropriate: allergies, current medications, past family history, past medical history, past social history, past surgical history, and problem list.    Review of Systems   Constitutional:  Negative for diaphoresis.   HENT:  Negative for nosebleeds and trouble swallowing.    Eyes:  Negative for blurred vision and visual disturbance.   Respiratory:  Negative for choking.    Gastrointestinal:  Negative for blood in stool.   Allergic/Immunologic: Negative for immunocompromised state.   Neurological:  Negative for facial asymmetry and speech difficulty.   Psychiatric/Behavioral:  Negative for self-injury and suicidal ideas.        Objective   Physical Exam  Vitals and nursing note reviewed.   Constitutional:       General: She is not in acute distress.     Appearance: She is well-developed. She is not ill-appearing or toxic-appearing.   HENT:      Head: Normocephalic.      Right Ear: External ear normal.      Left Ear: External ear normal.      Nose: Nose normal.      Mouth/Throat:      Pharynx: Oropharynx is clear.   Eyes:      General: No scleral icterus.     Conjunctiva/sclera: Conjunctivae normal.      Pupils: Pupils are equal, round, and reactive to light.   Neck:      Thyroid: No thyromegaly.   Cardiovascular:      Rate and Rhythm: Normal rate and regular rhythm.      Heart sounds: Normal heart sounds. No murmur heard.  Pulmonary:      Effort: Pulmonary effort is  normal. No respiratory distress.      Breath sounds: Normal breath sounds.   Musculoskeletal:         General: No deformity. Normal range of motion.      Cervical back: Normal range of motion and neck supple.   Skin:     General: Skin is warm and dry.      Findings: No rash.   Neurological:      General: No focal deficit present.      Mental Status: She is alert and oriented to person, place, and time. Mental status is at baseline.   Psychiatric:         Mood and Affect: Mood normal.         Behavior: Behavior normal.         Thought Content: Thought content normal.         Judgment: Judgment normal.           Assessment & Plan   Diagnoses and all orders for this visit:    1. Stage 3a chronic kidney disease (Primary)  -     US Renal Bilateral  -     Basic Metabolic Panel; Future  -     CBC & Differential; Future  -     Ferritin; Future  -     Iron Profile; Future  -     Microalbumin / Creatinine Urine Ratio - Urine, Clean Catch; Future    2. Medicare annual wellness visit, subsequent    3. Fibromyalgia    4. Low hemoglobin  -     Basic Metabolic Panel; Future  -     CBC & Differential; Future  -     Ferritin; Future  -     Iron Profile; Future  -     Microalbumin / Creatinine Urine Ratio - Urine, Clean Catch; Future    Consider increase bp Twynsta 10/80mg---get home bp check  Consider seeing dietician  Labs in 6 mos---will get urine microalbumin creatinine ratio and follow-up on renal labs  Ordering renal ultrasound to look at structure of kidney  Consider compound pain cream--stay off Mobic   Consider CA cardiac CT score and carotid doppler

## 2024-08-22 NOTE — PATIENT INSTRUCTIONS
Medicare Wellness  Personal Prevention Plan of Service     Date of Office Visit:    Encounter Provider:  Gina Valentino PA-C  Place of Service:  [facility]  Patient Name: Boby Robles  :  1955    As part of the Medicare Wellness portion of your visit today, we are providing you with this personalized preventive plan of services (PPPS). This plan is based upon recommendations of the [place] Preventive Services Task Force (USPSTF) and the Advisory Committee on Immunization Practices (ACIP).    This lists the preventive care services that should be considered, and provides dates of when you are due. Items listed as completed are up-to-date and do not require any further intervention.    Health Maintenance   Topic Date Due    ZOSTER VACCINE (2 of 2) 2016    INFLUENZA VACCINE  2024    COVID-19 Vaccine (2023- season) 2024 (Originally 2023)    COLORECTAL CANCER SCREENING  2025    LIPID PANEL  2025    MAMMOGRAM  2025    ANNUAL WELLNESS VISIT  2025    TDAP/TD VACCINES (2 - Td or Tdap) 2025    DXA SCAN  2026    Pneumococcal Vaccine 65+  Completed    HEPATITIS C SCREENING  Addressed    PAP SMEAR  Discontinued       Orders Placed This Encounter   Procedures    US Renal Bilateral     Order Specific Question:   Reason for Exam:     Answer:   [id number]----hkgo renal structure     Order Specific Question:   Release to patient     Answer:   Routine Release [[id number]]    Basic Metabolic Panel     Standing Status:   Future     Standing Expiration Date:   2025     Order Specific Question:   Release to patient     Answer:   Routine Release [[id number]]    Ferritin     Standing Status:   Future     Standing Expiration Date:   2025     Order Specific Question:   Release to patient     Answer:   Routine Release [[id number]]    Iron Profile     Standing Status:   Future     Standing Expiration Date:   2025     Order  Specific Question:   Release to patient     Answer:   Routine Release [[id number]]    Microalbumin / Creatinine Urine Ratio - Urine, Clean Catch     Standing Status:   Future     Standing Expiration Date:   8/22/2025     Order Specific Question:   Release to patient     Answer:   Routine Release [[id number]]    CBC & Differential     Standing Status:   Future     Standing Expiration Date:   8/22/2025     Order Specific Question:   Manual Differential     Answer:   No     Order Specific Question:   Release to patient     Answer:   Routine Release [[id number]]       No follow-ups on file.

## 2024-08-22 NOTE — PROGRESS NOTES
Subjective   The ABCs of the Annual Wellness Visit  Medicare Wellness Visit      Boby Robles is a 69 y.o. patient who presents for a Medicare Wellness Visit.    The following portions of the patient's history were reviewed and   updated as appropriate: allergies, current medications, past family history, past medical history, past social history, past surgical history, and problem list.    Compared to one year ago, the patient's physical   health is better.  Compared to one year ago, the patient's mental   health is better.    Recent Hospitalizations:  She was not admitted to the hospital during the last year.     Current Medical Providers:  Patient Care Team:  Gina Valentino PA-C as PCP - General (Family Medicine)  Gina Valentino PA-C as PCP - Family Medicine  Singh Lerma MD as Consulting Physician (Orthopedic Surgery)  Eloisa Marc APRN (Nurse Practitioner)  Jimmie Devries MD as Consulting Physician (Cardiology)    Outpatient Medications Prior to Visit   Medication Sig Dispense Refill    cholecalciferol (VITAMIN D3) 25 MCG (1000 UT) tablet Take 1 tablet by mouth Daily.      estrogens, conjugated, (Premarin) 0.625 MG tablet Take 1 tablet by mouth Daily. Take daily for 21 days then do not take for 7 days. 90 tablet 3    meloxicam (MOBIC) 15 MG tablet TAKE 1/2-1 TABLET BY MOUTH EVERY DAY AS NEEDED FOR PAIN. STOP IF GI UPSET 90 tablet 1    Multiple Vitamins-Minerals (MULTI ADULT GUMMIES PO) Take  by mouth.      telmisartan-amLODIPine (TWYNSTA) 80-5 MG per tablet Take 1 tablet by mouth Daily. for blood pressure.  And make appointment 90 tablet 3    vitamin E 400 UNIT capsule Take 1 capsule by mouth Daily.       No facility-administered medications prior to visit.     No opioid medication identified on active medication list. I have reviewed chart for other potential  high risk medication/s and harmful drug interactions in the elderly.      Aspirin is not on active medication list.  Aspirin  "use is not indicated based on review of current medical condition/s. Risk of harm outweighs potential benefits.  .    Patient Active Problem List   Diagnosis    Allergy    Fibromyalgia    Hypertension    Vitamin D deficiency    Hyperlipidemia, mixed    Renal insufficiency    Periodic sialadenosis    Esophageal reflux    Nausea    Hypertension     Advance Care Planning Advance Directive is not on file.  ACP discussion was held with the patient during this visit. Patient does not have an advance directive, information provided.            Objective   Vitals:    24 1521   BP: 142/64   Pulse: 61   Resp: 16   Temp: 97.1 °F (36.2 °C)   SpO2: 99%   Weight: 65.3 kg (144 lb)   Height: 162.6 cm (64.02\")   PainSc: 0-No pain       Estimated body mass index is 24.7 kg/m² as calculated from the following:    Height as of this encounter: 162.6 cm (64.02\").    Weight as of this encounter: 65.3 kg (144 lb).    BMI is within normal parameters. No other follow-up for BMI required.       Does the patient have evidence of cognitive impairment? No  Lab Results   Component Value Date    CHLPL 199 2024    TRIG 54 2024    HDL 90 (H) 2024    LDL 99 2024    VLDL 10 2024                                                                                               Health  Risk Assessment    Smoking Status:  Social History     Tobacco Use   Smoking Status Never    Passive exposure: Never   Smokeless Tobacco Never   Tobacco Comments    caffiene use 1 cup coffee daily     Alcohol Consumption:  Social History     Substance and Sexual Activity   Alcohol Use No       Fall Risk Screen  STEADI Fall Risk Assessment was completed, and patient is at LOW risk for falls.Assessment completed on:2024    Depression Screenin/22/2024     3:00 PM   PHQ-2/PHQ-9 Depression Screening   Little Interest or Pleasure in Doing Things 0-->not at all   Feeling Down, Depressed or Hopeless 0-->not at all   PHQ-9: Brief " Depression Severity Measure Score 0     Health Habits and Functional and Cognitive Screenin/16/2024     8:18 PM   Functional & Cognitive Status   Do you have difficulty preparing food and eating? No   Do you have difficulty bathing yourself, getting dressed or grooming yourself? No   Do you have difficulty using the toilet? No   Do you have difficulty moving around from place to place? No   Do you have trouble with steps or getting out of a bed or a chair? No   Current Diet Well Balanced Diet   Dental Exam Up to date   Eye Exam Up to date   Exercise (times per week) 3 times per week   Current Exercises Include House Cleaning;Walking   Do you need help using the phone?  No   Are you deaf or do you have serious difficulty hearing?  No   Do you need help to go to places out of walking distance? No   Do you need help shopping? No   Do you need help preparing meals?  No   Do you need help with housework?  No   Do you need help with laundry? No   Do you need help taking your medications? No   Do you need help managing money? No   Do you ever drive or ride in a car without wearing a seat belt? No   Have you felt unusual stress, anger or loneliness in the last month? Yes   Who do you live with? Spouse   If you need help, do you have trouble finding someone available to you? No   Have you been bothered in the last four weeks by sexual problems? No   Do you have difficulty concentrating, remembering or making decisions? No           Age-appropriate Screening Schedule:  Refer to the list below for future screening recommendations based on patient's age, sex and/or medical conditions. Orders for these recommended tests are listed in the plan section. The patient has been provided with a written plan.    Health Maintenance List  Health Maintenance   Topic Date Due    ZOSTER VACCINE (2 of 2) 2016    INFLUENZA VACCINE  2024    COVID-19 Vaccine (2023- season) 2024 (Originally 2023)    COLORECTAL  CANCER SCREENING  03/11/2025    LIPID PANEL  06/18/2025    MAMMOGRAM  08/14/2025    ANNUAL WELLNESS VISIT  08/22/2025    TDAP/TD VACCINES (2 - Td or Tdap) 08/26/2025    DXA SCAN  02/22/2026    Pneumococcal Vaccine 65+  Completed    HEPATITIS C SCREENING  Addressed    PAP SMEAR  Discontinued                                                                                                                                                CMS Preventative Services Quick Reference  Risk Factors Identified During Encounter  Chronic Pain:  fibromyalgia---consider topical tx PRN  Consider Shingrix, RSV,---flu  The above risks/problems have been discussed with the patient.  Pertinent information has been shared with the patient in the After Visit Summary.  An After Visit Summary and PPPS were made available to the patient.    Follow Up:   Next Medicare Wellness visit to be scheduled in 1 year.     Assessment & Plan               Follow Up:   No follow-ups on file.

## 2024-08-29 ENCOUNTER — PRIOR AUTHORIZATION (OUTPATIENT)
Dept: FAMILY MEDICINE CLINIC | Facility: CLINIC | Age: 69
End: 2024-08-29
Payer: MEDICARE

## 2024-08-29 NOTE — TELEPHONE ENCOUNTER
CAT CARRANZA (Key: [id number])  PA Case ID #: [id number]  Need Help? Call us at [phone]  Outcome  Approved today by [employer] 2017  PA Case: [id number], Status: Approved, Coverage Starts on: 5/30/2024 12:00:00 AM, Coverage Ends on: 8/29/2025 12:00:00 AM.  Authorization Expiration Date: 8/28/2025  Drug  Premarin 0.625MG tablets  ePA cloud logo  Form  [employer] Electronic PA Form (2017 NCPDP)

## 2024-12-19 ENCOUNTER — TELEPHONE (OUTPATIENT)
Dept: FAMILY MEDICINE CLINIC | Facility: CLINIC | Age: 69
End: 2024-12-19
Payer: MEDICARE

## 2024-12-19 DIAGNOSIS — R92.8 ABNORMAL SCREENING MAMMOGRAM: Primary | ICD-10-CM

## 2024-12-19 NOTE — TELEPHONE ENCOUNTER
Order for L Breast US to go with f/u Mammo.  Please see attached for review/signature.   Fax [phone]

## 2025-01-22 ENCOUNTER — TELEPHONE (OUTPATIENT)
Dept: FAMILY MEDICINE CLINIC | Facility: CLINIC | Age: 70
End: 2025-01-22
Payer: MEDICARE

## 2025-01-22 RX ORDER — FLUCONAZOLE 150 MG/1
150 TABLET ORAL ONCE
Qty: 1 TABLET | Refills: 2 | Status: SHIPPED | OUTPATIENT
Start: 2025-01-22 | End: 2025-01-22

## 2025-01-22 NOTE — TELEPHONE ENCOUNTER
Caller: Margaret Boby AYUSH    Relationship: Self    Best call back number: [phone]     What medication are you requesting:       What are your current symptoms:      How long have you been experiencing symptoms:      Have you had these symptoms before:    [] Yes  [] No    Have you been treated for these symptoms before:   [] Yes  [] No    If a prescription is needed, what is your preferred pharmacy and phone number: [facility]Y #[id number] - [facility] [place] - [address] AT [facility] [phone] [facility] [phone]      Additional notes: PATIENT CALLED STATED SHE IS HAVING VAGINAL DISCHARGE AND SHE HAD TAKEN ANTIBIOTICS COUPLE MONTHS AGO.  WANTS TO KNOW IF CINDI ELIZONDO WILL CALL IN THE MEDICATION DIFLUCAN FOR HER TO THE PHARMACY.

## 2025-02-12 DIAGNOSIS — Z12.11 SCREEN FOR COLON CANCER: Primary | ICD-10-CM

## 2025-03-17 ENCOUNTER — OFFICE (AMBULATORY)
Dept: URBAN - METROPOLITAN AREA PATHOLOGY 4 | Facility: PATHOLOGY | Age: 70
End: 2025-03-17
Payer: MEDICARE

## 2025-03-17 DIAGNOSIS — D12.3 BENIGN NEOPLASM OF TRANSVERSE COLON: ICD-10-CM

## 2025-03-17 PROBLEM — K57.30 DIVERTICULOSIS OF LARGE INTESTINE WITHOUT PERFORATION OR ABS: Status: ACTIVE | Noted: 2025-03-17

## 2025-03-17 PROBLEM — K63.5 POLYP OF COLON: Status: ACTIVE | Noted: 2025-03-17

## 2025-03-17 PROCEDURE — 88305 TISSUE EXAM BY PATHOLOGIST: CPT | Performed by: PATHOLOGY

## 2025-06-05 ENCOUNTER — OFFICE VISIT (OUTPATIENT)
Dept: FAMILY MEDICINE CLINIC | Facility: CLINIC | Age: 70
End: 2025-06-05
Payer: MEDICARE

## 2025-06-05 VITALS
HEIGHT: 64 IN | OXYGEN SATURATION: 99 % | SYSTOLIC BLOOD PRESSURE: 130 MMHG | TEMPERATURE: 98.4 F | DIASTOLIC BLOOD PRESSURE: 66 MMHG | WEIGHT: 145.6 LBS | HEART RATE: 65 BPM | BODY MASS INDEX: 24.86 KG/M2

## 2025-06-05 DIAGNOSIS — E78.2 HYPERLIPIDEMIA, MIXED: ICD-10-CM

## 2025-06-05 DIAGNOSIS — Z79.890 POSTMENOPAUSAL HRT (HORMONE REPLACEMENT THERAPY): ICD-10-CM

## 2025-06-05 DIAGNOSIS — Z13.6 SCREENING FOR ISCHEMIC HEART DISEASE: ICD-10-CM

## 2025-06-05 DIAGNOSIS — N18.31 STAGE 3A CHRONIC KIDNEY DISEASE: ICD-10-CM

## 2025-06-05 DIAGNOSIS — E55.9 VITAMIN D DEFICIENCY: ICD-10-CM

## 2025-06-05 DIAGNOSIS — I10 ESSENTIAL HYPERTENSION: Primary | ICD-10-CM

## 2025-06-05 DIAGNOSIS — D64.9 LOW HEMOGLOBIN: ICD-10-CM

## 2025-06-05 PROBLEM — K21.9 ESOPHAGEAL REFLUX: Status: RESOLVED | Noted: 2022-08-23 | Resolved: 2025-06-05

## 2025-06-05 RX ORDER — TELMISARTAN AND AMLODIPINE 5; 80 MG/1; MG/1
1 TABLET ORAL DAILY
Qty: 90 TABLET | Refills: 1 | Status: SHIPPED | OUTPATIENT
Start: 2025-06-05

## 2025-06-05 NOTE — PROGRESS NOTES
"Subjective   Boby Robles is a 69 y.o. female.     Hypertension  Associated symptoms: no blurred vision, no chest pain, no palpitations and no shortness of breath        Since the last visit, she has overall felt well.  She has Primary Hypertension and well controlled on current medication, Impaired fasting glucose and will monitor labs to watch for DMII, Hyperlipidemia and working on this with diet and exercise, and Vitamin D deficiency and will update labs for continued management.  she has been compliant with current medications have reviewed them.  The patient denies medication side effects.  Will refill medications. /66 (BP Location: Left arm, Patient Position: Sitting, Cuff Size: Adult)   Pulse 65   Temp 98.4 °F (36.9 °C) (Oral)   Ht 162.6 cm (64.02\")   Wt 66 kg (145 lb 9.6 oz)   LMP  (LMP Unknown)   SpO2 99%   BMI 24.98 kg/m² .  BMI is within normal parameters. No other follow-up for BMI required.    Walking and yard work  Good family support        Results for orders placed or performed in visit on 01/15/24   Comprehensive metabolic panel    Collection Time: 06/18/24  8:46 AM    Specimen: Blood   Result Value Ref Range    Glucose 92 65 - 99 mg/dL    BUN 12 8 - 23 mg/dL    Creatinine 1.07 (H) 0.57 - 1.00 mg/dL    EGFR Result 56.7 (L) >60.0 mL/min/1.73    BUN/Creatinine Ratio 11.2 7.0 - 25.0    Sodium 141 136 - 145 mmol/L    Potassium 4.3 3.5 - 5.2 mmol/L    Chloride 106 98 - 107 mmol/L    Total CO2 24.1 22.0 - 29.0 mmol/L    Calcium 9.4 8.6 - 10.5 mg/dL    Total Protein 6.6 6.0 - 8.5 g/dL    Albumin 4.1 3.5 - 5.2 g/dL    Globulin 2.5 gm/dL    A/G Ratio 1.6 g/dL    Total Bilirubin 0.3 0.0 - 1.2 mg/dL    Alkaline Phosphatase 65 39 - 117 U/L    AST (SGOT) 23 1 - 32 U/L    ALT (SGPT) 16 1 - 33 U/L   Lipid panel    Collection Time: 06/18/24  8:46 AM    Specimen: Blood   Result Value Ref Range    Total Cholesterol 199 0 - 200 mg/dL    Triglycerides 54 0 - 150 mg/dL    HDL Cholesterol 90 (H) 40 " - 60 mg/dL    VLDL Cholesterol Alcides 10 5 - 40 mg/dL    LDL Chol Calc (NIH) 99 0 - 100 mg/dL   TSH    Collection Time: 06/18/24  8:46 AM    Specimen: Blood   Result Value Ref Range    TSH 1.110 0.270 - 4.200 uIU/mL   T4, Free    Collection Time: 06/18/24  8:46 AM    Specimen: Blood   Result Value Ref Range    Free T4 1.24 0.92 - 1.68 ng/dL   Vitamin D,25-Hydroxy    Collection Time: 06/18/24  8:46 AM    Specimen: Blood   Result Value Ref Range    25 Hydroxy, Vitamin D 45.9 30.0 - 100.0 ng/mL   Vitamin B12    Collection Time: 06/18/24  8:46 AM    Specimen: Blood   Result Value Ref Range    Vitamin B-12 795 211 - 946 pg/mL   Folate    Collection Time: 06/18/24  8:46 AM    Specimen: Blood   Result Value Ref Range    Folate >20.00 4.78 - 24.20 ng/mL   Uric Acid    Collection Time: 06/18/24  8:46 AM    Specimen: Blood   Result Value Ref Range    Uric Acid 4.7 2.4 - 5.7 mg/dL   Microscopic Examination -    Collection Time: 06/18/24  8:46 AM   Result Value Ref Range    WBC, UA 0-2 /HPF    RBC, UA 0-2 /HPF    Epithelial Cells (non renal) 3-6 (A) /HPF    Cast Type Comment     Bacteria, UA Trace (A) None Seen /HPF   CBC and Differential    Collection Time: 06/18/24  8:46 AM    Specimen: Blood   Result Value Ref Range    WBC 4.88 3.40 - 10.80 10*3/mm3    RBC 3.97 3.77 - 5.28 10*6/mm3    Hemoglobin 11.7 (L) 12.0 - 15.9 g/dL    Hematocrit 37.2 34.0 - 46.6 %    MCV 93.7 79.0 - 97.0 fL    MCH 29.5 26.6 - 33.0 pg    MCHC 31.5 31.5 - 35.7 g/dL    RDW 11.1 (L) 12.3 - 15.4 %    Platelets 232 140 - 450 10*3/mm3    Neutrophil Rel % 61.4 42.7 - 76.0 %    Lymphocyte Rel % 29.9 19.6 - 45.3 %    Monocyte Rel % 5.3 5.0 - 12.0 %    Eosinophil Rel % 2.0 0.3 - 6.2 %    Basophil Rel % 1.2 0.0 - 1.5 %    Neutrophils Absolute 2.99 1.70 - 7.00 10*3/mm3    Lymphocytes Absolute 1.46 0.70 - 3.10 10*3/mm3    Monocytes Absolute 0.26 0.10 - 0.90 10*3/mm3    Eosinophils Absolute 0.10 0.00 - 0.40 10*3/mm3    Basophils Absolute 0.06 0.00 - 0.20 10*3/mm3     Immature Granulocyte Rel % 0.2 0.0 - 0.5 %    Immature Grans Absolute 0.01 0.00 - 0.05 10*3/mm3    nRBC 0.0 0.0 - 0.2 /100 WBC   Urinalysis With Microscopic - Urine, Clean Catch    Collection Time: 06/18/24  8:46 AM    Specimen: Urine, Clean Catch   Result Value Ref Range    Specific Gravity, UA 1.019 1.005 - 1.030    pH, UA 6.5 5.0 - 8.0    Color, UA Yellow     Appearance, UA Clear Clear    Leukocytes, UA Negative Negative    Protein Negative Negative    Glucose, UA Negative Negative    Ketones Negative Negative    Blood, UA Negative Negative    Bilirubin, UA Negative Negative    Urobilinogen, UA Comment     Nitrite, UA Negative Negative     Mammogram screening yesterday was normal negative    Went to  --- for impacted cerumen and had irrigation and that was on 12/21/2024 and was successful    Colonoscopy 3-17-25---DR Hernandez      Saw DR Devries 3-15-22  SUMMARY/DISCUSSION  Hypertension blood pressures good.  Strong family history of coronary artery disease.  Patient had a stress test back in March of this year that was unremarkable.  Patient carries diagnosis of fibromyalgia.  From a cardiovascular standpoint she is stable encourage her to continue to exercise.  I would see her back on an as-needed basis.  The only other thing I could possibly suggest is a calcium scoring.  This is only if we will place her on a statin besides that I would see patient back on an as-needed basis.   Failed Savella and Cymbalta   home bp 120s/70s  Fibromyalgia and OA--- has Voltaren gel  She wants to stay on HRT  DEXA scan 2/22/2024 normal  The following portions of the patient's history were reviewed and updated as appropriate: allergies, current medications, past family history, past medical history, past social history, past surgical history, and problem list.    Review of Systems   Constitutional:  Negative for diaphoresis.   HENT:  Negative for nosebleeds and trouble swallowing.    Eyes:  Negative for blurred vision and  visual disturbance.   Respiratory:  Negative for choking and shortness of breath.    Cardiovascular:  Negative for chest pain and palpitations.   Gastrointestinal:  Negative for blood in stool.   Allergic/Immunologic: Negative for immunocompromised state.   Neurological:  Negative for facial asymmetry and speech difficulty.   Psychiatric/Behavioral:  Negative for self-injury and suicidal ideas.        Objective   Physical Exam  Vitals and nursing note reviewed.   Constitutional:       General: She is not in acute distress.     Appearance: Normal appearance. She is well-developed. She is not ill-appearing or toxic-appearing.   HENT:      Head: Normocephalic.      Right Ear: External ear normal.      Left Ear: External ear normal.      Nose: Nose normal.      Mouth/Throat:      Pharynx: Oropharynx is clear.   Eyes:      General: No scleral icterus.     Conjunctiva/sclera: Conjunctivae normal.      Pupils: Pupils are equal, round, and reactive to light.   Neck:      Thyroid: No thyromegaly.   Cardiovascular:      Rate and Rhythm: Normal rate and regular rhythm.      Pulses: Normal pulses.      Heart sounds: Normal heart sounds. No murmur heard.  Pulmonary:      Effort: Pulmonary effort is normal. No respiratory distress.      Breath sounds: Normal breath sounds.   Musculoskeletal:         General: No deformity. Normal range of motion.      Cervical back: Normal range of motion and neck supple.      Right lower leg: No edema.      Left lower leg: No edema.   Skin:     General: Skin is warm and dry.      Findings: No rash.   Neurological:      General: No focal deficit present.      Mental Status: She is alert and oriented to person, place, and time. Mental status is at baseline.   Psychiatric:         Mood and Affect: Mood normal.         Behavior: Behavior normal.         Thought Content: Thought content normal.         Judgment: Judgment normal.           Assessment & Plan   Diagnoses and all orders for this  visit:    1. Essential hypertension (Primary)    2. Low hemoglobin    3. Stage 3a chronic kidney disease    4. Vitamin D deficiency    5. Hyperlipidemia, mixed        Consider shingles vaccine, consider updating Tdap in August  Get Ca cardiac CT  Plan, Boby Villaton, was seen today.  she was seen for HTN and continue medication, Hyperlipidemia and will work on this with diet and exercise, and Vitamin D deficiency and will update labs .  Will get ferritin and iron level checked the lower range hemoglobin and update renal function----will update labs for renal function to get urine creatinine microalbumin ratio and if remains chronic kidney disease stage III will get renal ultrasound  Will continue Premarin she desires to stay on HRT

## 2025-06-06 ENCOUNTER — RESULTS FOLLOW-UP (OUTPATIENT)
Dept: FAMILY MEDICINE CLINIC | Facility: CLINIC | Age: 70
End: 2025-06-06
Payer: MEDICARE

## 2025-06-06 LAB
25(OH)D3+25(OH)D2 SERPL-MCNC: 50.6 NG/ML (ref 30–100)
ALBUMIN SERPL-MCNC: 4.2 G/DL (ref 3.9–4.9)
ALBUMIN/CREAT UR: <14 MG/G CREAT (ref 0–29)
ALP SERPL-CCNC: 68 IU/L (ref 44–121)
ALT SERPL-CCNC: 15 IU/L (ref 0–32)
APPEARANCE UR: CLEAR
AST SERPL-CCNC: 26 IU/L (ref 0–40)
BACTERIA #/AREA URNS HPF: NORMAL /[HPF]
BASOPHILS # BLD AUTO: 0.1 X10E3/UL (ref 0–0.2)
BASOPHILS NFR BLD AUTO: 1 %
BILIRUB SERPL-MCNC: 0.3 MG/DL (ref 0–1.2)
BILIRUB UR QL STRIP: NEGATIVE
BUN SERPL-MCNC: 14 MG/DL (ref 8–27)
BUN/CREAT SERPL: 13 (ref 12–28)
CALCIUM SERPL-MCNC: 10 MG/DL (ref 8.7–10.3)
CASTS URNS QL MICRO: NORMAL /LPF
CHLORIDE SERPL-SCNC: 105 MMOL/L (ref 96–106)
CHOLEST SERPL-MCNC: 201 MG/DL (ref 100–199)
CO2 SERPL-SCNC: 22 MMOL/L (ref 20–29)
COLOR UR: YELLOW
CREAT SERPL-MCNC: 1.07 MG/DL (ref 0.57–1)
CREAT UR-MCNC: 21.7 MG/DL
EGFRCR SERPLBLD CKD-EPI 2021: 56 ML/MIN/1.73
EOSINOPHIL # BLD AUTO: 0 X10E3/UL (ref 0–0.4)
EOSINOPHIL NFR BLD AUTO: 1 %
EPI CELLS #/AREA URNS HPF: NORMAL /HPF (ref 0–10)
ERYTHROCYTE [DISTWIDTH] IN BLOOD BY AUTOMATED COUNT: 11 % (ref 11.7–15.4)
FERRITIN SERPL-MCNC: 120 NG/ML (ref 15–150)
FOLATE SERPL-MCNC: >20 NG/ML
GLOBULIN SER CALC-MCNC: 2.8 G/DL (ref 1.5–4.5)
GLUCOSE SERPL-MCNC: 91 MG/DL (ref 70–99)
GLUCOSE UR QL STRIP: NEGATIVE
HCT VFR BLD AUTO: 40.4 % (ref 34–46.6)
HDLC SERPL-MCNC: 94 MG/DL
HGB BLD-MCNC: 12.7 G/DL (ref 11.1–15.9)
HGB UR QL STRIP: NEGATIVE
IMM GRANULOCYTES # BLD AUTO: 0 X10E3/UL (ref 0–0.1)
IMM GRANULOCYTES NFR BLD AUTO: 0 %
IRON SATN MFR SERPL: 41 % (ref 15–55)
IRON SERPL-MCNC: 136 UG/DL (ref 27–139)
KETONES UR QL STRIP: NEGATIVE
LDLC SERPL CALC-MCNC: 90 MG/DL (ref 0–99)
LEUKOCYTE ESTERASE UR QL STRIP: NEGATIVE
LYMPHOCYTES # BLD AUTO: 1.7 X10E3/UL (ref 0.7–3.1)
LYMPHOCYTES NFR BLD AUTO: 24 %
MCH RBC QN AUTO: 30 PG (ref 26.6–33)
MCHC RBC AUTO-ENTMCNC: 31.4 G/DL (ref 31.5–35.7)
MCV RBC AUTO: 95 FL (ref 79–97)
MICRO URNS: NORMAL
MICRO URNS: NORMAL
MICROALBUMIN UR-MCNC: <3 UG/ML
MONOCYTES # BLD AUTO: 0.3 X10E3/UL (ref 0.1–0.9)
MONOCYTES NFR BLD AUTO: 5 %
NEUTROPHILS # BLD AUTO: 4.8 X10E3/UL (ref 1.4–7)
NEUTROPHILS NFR BLD AUTO: 69 %
NITRITE UR QL STRIP: NEGATIVE
PH UR STRIP: 7 [PH] (ref 5–7.5)
PLATELET # BLD AUTO: 244 X10E3/UL (ref 150–450)
POTASSIUM SERPL-SCNC: 5.1 MMOL/L (ref 3.5–5.2)
PROT SERPL-MCNC: 7 G/DL (ref 6–8.5)
PROT UR QL STRIP: NEGATIVE
RBC # BLD AUTO: 4.24 X10E6/UL (ref 3.77–5.28)
RBC #/AREA URNS HPF: NORMAL /HPF (ref 0–2)
SODIUM SERPL-SCNC: 141 MMOL/L (ref 134–144)
SP GR UR STRIP: 1.01 (ref 1–1.03)
T4 FREE SERPL-MCNC: 1.11 NG/DL (ref 0.82–1.77)
TIBC SERPL-MCNC: 328 UG/DL (ref 250–450)
TRIGL SERPL-MCNC: 96 MG/DL (ref 0–149)
TSH SERPL DL<=0.005 MIU/L-ACNC: 0.74 UIU/ML (ref 0.45–4.5)
UIBC SERPL-MCNC: 192 UG/DL (ref 118–369)
UROBILINOGEN UR STRIP-MCNC: 0.2 MG/DL (ref 0.2–1)
VIT B12 SERPL-MCNC: 808 PG/ML (ref 232–1245)
VLDLC SERPL CALC-MCNC: 17 MG/DL (ref 5–40)
WBC # BLD AUTO: 6.9 X10E3/UL (ref 3.4–10.8)
WBC #/AREA URNS HPF: NORMAL /HPF (ref 0–5)

## 2025-08-25 ENCOUNTER — OFFICE VISIT (OUTPATIENT)
Dept: FAMILY MEDICINE CLINIC | Facility: CLINIC | Age: 70
End: 2025-08-25
Payer: MEDICARE

## 2025-08-25 VITALS
HEIGHT: 64 IN | DIASTOLIC BLOOD PRESSURE: 70 MMHG | WEIGHT: 147.8 LBS | SYSTOLIC BLOOD PRESSURE: 128 MMHG | BODY MASS INDEX: 25.23 KG/M2 | HEART RATE: 68 BPM | OXYGEN SATURATION: 100 % | TEMPERATURE: 96.4 F

## 2025-08-25 DIAGNOSIS — I10 ESSENTIAL HYPERTENSION: ICD-10-CM

## 2025-08-25 DIAGNOSIS — Z00.00 MEDICARE ANNUAL WELLNESS VISIT, SUBSEQUENT: Primary | ICD-10-CM

## 2025-08-25 DIAGNOSIS — N18.31 STAGE 3A CHRONIC KIDNEY DISEASE: ICD-10-CM

## 2025-08-25 DIAGNOSIS — E78.2 HYPERLIPIDEMIA, MIXED: ICD-10-CM

## 2025-08-25 PROCEDURE — 3078F DIAST BP <80 MM HG: CPT | Performed by: NURSE PRACTITIONER

## 2025-08-25 PROCEDURE — 3074F SYST BP LT 130 MM HG: CPT | Performed by: NURSE PRACTITIONER

## 2025-08-25 PROCEDURE — G0439 PPPS, SUBSEQ VISIT: HCPCS | Performed by: NURSE PRACTITIONER

## 2025-08-25 PROCEDURE — 99214 OFFICE O/P EST MOD 30 MIN: CPT | Performed by: NURSE PRACTITIONER

## 2025-08-25 PROCEDURE — G2211 COMPLEX E/M VISIT ADD ON: HCPCS | Performed by: NURSE PRACTITIONER

## 2025-08-25 PROCEDURE — 1126F AMNT PAIN NOTED NONE PRSNT: CPT | Performed by: NURSE PRACTITIONER

## 2025-08-25 RX ORDER — TELMISARTAN AND AMLODIPINE 5; 80 MG/1; MG/1
1 TABLET ORAL DAILY
Qty: 90 TABLET | Refills: 1 | Status: SHIPPED | OUTPATIENT
Start: 2025-08-25